# Patient Record
Sex: FEMALE | Race: WHITE
[De-identification: names, ages, dates, MRNs, and addresses within clinical notes are randomized per-mention and may not be internally consistent; named-entity substitution may affect disease eponyms.]

---

## 2018-03-14 ENCOUNTER — HOSPITAL ENCOUNTER (OUTPATIENT)
Dept: HOSPITAL 83 - MAMMO | Age: 63
Discharge: HOME | End: 2018-03-14
Attending: NURSE PRACTITIONER
Payer: MEDICARE

## 2018-03-14 DIAGNOSIS — Z12.31: Primary | ICD-10-CM

## 2019-06-14 ENCOUNTER — HOSPITAL ENCOUNTER (OUTPATIENT)
Dept: HOSPITAL 83 - US | Age: 64
Discharge: HOME | End: 2019-06-14
Attending: NURSE PRACTITIONER
Payer: MEDICARE

## 2019-06-14 DIAGNOSIS — I12.0: ICD-10-CM

## 2019-06-14 DIAGNOSIS — R06.83: ICD-10-CM

## 2019-06-14 DIAGNOSIS — I73.9: ICD-10-CM

## 2019-06-14 DIAGNOSIS — R40.0: ICD-10-CM

## 2019-06-14 DIAGNOSIS — N18.5: ICD-10-CM

## 2019-06-14 DIAGNOSIS — E78.5: Primary | ICD-10-CM

## 2019-06-14 DIAGNOSIS — D63.1: ICD-10-CM

## 2019-08-14 ENCOUNTER — HOSPITAL ENCOUNTER (EMERGENCY)
Dept: HOSPITAL 83 - ED | Age: 64
Discharge: HOME | End: 2019-08-14
Payer: MEDICARE

## 2019-08-14 VITALS — WEIGHT: 238 LBS | BODY MASS INDEX: 42.17 KG/M2 | HEIGHT: 62.99 IN

## 2019-08-14 DIAGNOSIS — Z95.1: ICD-10-CM

## 2019-08-14 DIAGNOSIS — Z79.899: ICD-10-CM

## 2019-08-14 DIAGNOSIS — Z95.5: ICD-10-CM

## 2019-08-14 DIAGNOSIS — Z98.51: ICD-10-CM

## 2019-08-14 DIAGNOSIS — E66.9: ICD-10-CM

## 2019-08-14 DIAGNOSIS — I13.2: ICD-10-CM

## 2019-08-14 DIAGNOSIS — E78.00: ICD-10-CM

## 2019-08-14 DIAGNOSIS — Z79.82: ICD-10-CM

## 2019-08-14 DIAGNOSIS — Z91.040: ICD-10-CM

## 2019-08-14 DIAGNOSIS — I48.91: ICD-10-CM

## 2019-08-14 DIAGNOSIS — I25.10: ICD-10-CM

## 2019-08-14 DIAGNOSIS — Z87.891: ICD-10-CM

## 2019-08-14 DIAGNOSIS — Z98.890: ICD-10-CM

## 2019-08-14 DIAGNOSIS — N18.6: ICD-10-CM

## 2019-08-14 DIAGNOSIS — I25.2: ICD-10-CM

## 2019-08-14 DIAGNOSIS — I50.30: ICD-10-CM

## 2019-08-14 DIAGNOSIS — K21.9: ICD-10-CM

## 2019-08-14 DIAGNOSIS — J44.9: Primary | ICD-10-CM

## 2019-08-14 DIAGNOSIS — Z99.2: ICD-10-CM

## 2019-08-14 LAB
ALBUMIN SERPL-MCNC: 3.7 GM/DL (ref 3.1–4.5)
ALP SERPL-CCNC: 77 U/L (ref 45–117)
ALT SERPL W P-5'-P-CCNC: 40 U/L (ref 12–78)
AST SERPL-CCNC: 27 IU/L (ref 3–35)
BASOPHILS # BLD AUTO: 1 % (ref 0–1)
BUN SERPL-MCNC: 33 MG/DL (ref 7–24)
CHLORIDE SERPL-SCNC: 99 MMOL/L (ref 98–107)
CREAT SERPL-MCNC: 8.57 MG/DL (ref 0.55–1.02)
ERYTHROCYTE [DISTWIDTH] IN BLOOD BY AUTOMATED COUNT: 14.6 % (ref 0–14.5)
HCT VFR BLD AUTO: 36.7 % (ref 37–47)
HGB BLD-MCNC: 11.4 G/DL (ref 12–16)
MACROCYTES BLD QL SMEAR: SLIGHT
MCH RBC QN AUTO: 34.7 PG (ref 27–31)
MCHC RBC AUTO-ENTMCNC: 31.1 G/DL (ref 33–37)
MCV RBC AUTO: 111.6 FL (ref 81–99)
NRBC BLD QL AUTO: 0 % (ref 0–0)
PHOSPHATE SERPL-MCNC: 5.4 MG/DL (ref 2.5–4.9)
PLATELET # BLD AUTO: 202 10*3/UL (ref 130–400)
PLATELET SUFFICIENCY: NORMAL
PMV BLD AUTO: 9.7 FL (ref 9.6–12.3)
POTASSIUM SERPL-SCNC: 4.4 MMOL/L (ref 3.5–5.1)
PROT SERPL-MCNC: 7.7 GM/DL (ref 6.4–8.2)
RBC # BLD AUTO: 3.29 10*6/UL (ref 4.1–5.1)
SODIUM SERPL-SCNC: 139 MMOL/L (ref 136–145)
TOTAL CELLS COUNTED: 100 #CELLS
WBC NRBC COR # BLD AUTO: 4.9 10*3/UL (ref 4.8–10.8)

## 2020-06-16 ENCOUNTER — HOSPITAL ENCOUNTER (INPATIENT)
Dept: HOSPITAL 83 - ED | Age: 65
LOS: 6 days | Discharge: ANOTHER HOSPITAL | DRG: 628 | End: 2020-06-22
Attending: INTERNAL MEDICINE | Admitting: INTERNAL MEDICINE
Payer: MEDICARE

## 2020-06-16 VITALS — DIASTOLIC BLOOD PRESSURE: 44 MMHG | SYSTOLIC BLOOD PRESSURE: 123 MMHG

## 2020-06-16 VITALS — DIASTOLIC BLOOD PRESSURE: 39 MMHG | SYSTOLIC BLOOD PRESSURE: 127 MMHG

## 2020-06-16 VITALS — DIASTOLIC BLOOD PRESSURE: 39 MMHG

## 2020-06-16 VITALS — WEIGHT: 233.38 LBS | HEIGHT: 62.99 IN | BODY MASS INDEX: 41.35 KG/M2

## 2020-06-16 VITALS — DIASTOLIC BLOOD PRESSURE: 51 MMHG

## 2020-06-16 DIAGNOSIS — D63.8: ICD-10-CM

## 2020-06-16 DIAGNOSIS — N18.6: ICD-10-CM

## 2020-06-16 DIAGNOSIS — Z79.899: ICD-10-CM

## 2020-06-16 DIAGNOSIS — Z79.82: ICD-10-CM

## 2020-06-16 DIAGNOSIS — E11.621: ICD-10-CM

## 2020-06-16 DIAGNOSIS — E43: ICD-10-CM

## 2020-06-16 DIAGNOSIS — E87.5: ICD-10-CM

## 2020-06-16 DIAGNOSIS — J44.1: ICD-10-CM

## 2020-06-16 DIAGNOSIS — Z95.1: ICD-10-CM

## 2020-06-16 DIAGNOSIS — E78.2: ICD-10-CM

## 2020-06-16 DIAGNOSIS — Z82.3: ICD-10-CM

## 2020-06-16 DIAGNOSIS — Z99.2: ICD-10-CM

## 2020-06-16 DIAGNOSIS — E66.9: ICD-10-CM

## 2020-06-16 DIAGNOSIS — E66.01: ICD-10-CM

## 2020-06-16 DIAGNOSIS — Z95.5: ICD-10-CM

## 2020-06-16 DIAGNOSIS — E87.1: ICD-10-CM

## 2020-06-16 DIAGNOSIS — Z98.51: ICD-10-CM

## 2020-06-16 DIAGNOSIS — Z66: ICD-10-CM

## 2020-06-16 DIAGNOSIS — Z91.040: ICD-10-CM

## 2020-06-16 DIAGNOSIS — L97.529: ICD-10-CM

## 2020-06-16 DIAGNOSIS — Z83.3: ICD-10-CM

## 2020-06-16 DIAGNOSIS — E11.65: ICD-10-CM

## 2020-06-16 DIAGNOSIS — M86.172: ICD-10-CM

## 2020-06-16 DIAGNOSIS — K21.9: ICD-10-CM

## 2020-06-16 DIAGNOSIS — I50.32: ICD-10-CM

## 2020-06-16 DIAGNOSIS — Z95.2: ICD-10-CM

## 2020-06-16 DIAGNOSIS — Z80.8: ICD-10-CM

## 2020-06-16 DIAGNOSIS — I25.2: ICD-10-CM

## 2020-06-16 DIAGNOSIS — B95.2: ICD-10-CM

## 2020-06-16 DIAGNOSIS — E83.39: ICD-10-CM

## 2020-06-16 DIAGNOSIS — Z51.5: ICD-10-CM

## 2020-06-16 DIAGNOSIS — I25.10: ICD-10-CM

## 2020-06-16 DIAGNOSIS — Z82.49: ICD-10-CM

## 2020-06-16 DIAGNOSIS — E11.69: Primary | ICD-10-CM

## 2020-06-16 DIAGNOSIS — E11.42: ICD-10-CM

## 2020-06-16 DIAGNOSIS — Z84.1: ICD-10-CM

## 2020-06-16 DIAGNOSIS — Z20.828: ICD-10-CM

## 2020-06-16 DIAGNOSIS — E11.51: ICD-10-CM

## 2020-06-16 DIAGNOSIS — G25.81: ICD-10-CM

## 2020-06-16 DIAGNOSIS — B96.20: ICD-10-CM

## 2020-06-16 DIAGNOSIS — I48.91: ICD-10-CM

## 2020-06-16 DIAGNOSIS — Z91.09: ICD-10-CM

## 2020-06-16 DIAGNOSIS — L03.032: ICD-10-CM

## 2020-06-16 DIAGNOSIS — Z87.891: ICD-10-CM

## 2020-06-16 DIAGNOSIS — Z91.81: ICD-10-CM

## 2020-06-16 DIAGNOSIS — I11.0: ICD-10-CM

## 2020-06-16 LAB
ALBUMIN SERPL-MCNC: 2.8 GM/DL (ref 3.1–4.5)
ALP SERPL-CCNC: 58 U/L (ref 45–117)
ALT SERPL W P-5'-P-CCNC: 17 U/L (ref 12–78)
APTT PPP: 27.5 SECONDS (ref 20–32.1)
AST SERPL-CCNC: 16 IU/L (ref 3–35)
BASOPHILS # BLD AUTO: 1 % (ref 0–1)
BUN SERPL-MCNC: 36 MG/DL (ref 7–24)
CHLORIDE SERPL-SCNC: 98 MMOL/L (ref 98–107)
CREAT SERPL-MCNC: 8.05 MG/DL (ref 0.55–1.02)
ERYTHROCYTE [DISTWIDTH] IN BLOOD BY AUTOMATED COUNT: 13.1 % (ref 0–14.5)
HCT VFR BLD AUTO: 33.6 % (ref 37–47)
INR BLD: 1.1 (ref 2–3.5)
LIPASE SERPL-CCNC: 118 U/L (ref 73–393)
MACROCYTES BLD QL SMEAR: SLIGHT
MCH RBC QN AUTO: 34.2 PG (ref 27–31)
MCHC RBC AUTO-ENTMCNC: 31.5 G/DL (ref 33–37)
MCV RBC AUTO: 108.4 FL (ref 81–99)
NRBC BLD QL AUTO: 0 10*3/UL (ref 0–0)
PLATELET # BLD AUTO: 304 10*3/UL (ref 130–400)
PLATELET SUFFICIENCY: NORMAL
PMV BLD AUTO: 10 FL (ref 9.6–12.3)
POLYCHROMASIA BLD QL SMEAR: SLIGHT
POTASSIUM SERPL-SCNC: 4.6 MMOL/L (ref 3.5–5.1)
PROT SERPL-MCNC: 7.9 GM/DL (ref 6.4–8.2)
RBC # BLD AUTO: 3.1 10*6/UL (ref 4.1–5.1)
ROULEAUX BLD QL SMEAR: SLIGHT
SODIUM SERPL-SCNC: 135 MMOL/L (ref 136–145)
TOTAL CELLS COUNTED: 100 #CELLS
TROPONIN I SERPL-MCNC: < 0.015 NG/ML (ref ?–0.04)
WBC NRBC COR # BLD AUTO: 7.6 10*3/UL (ref 4.8–10.8)

## 2020-06-16 NOTE — NUR
NORCO GIVEN FOR PAIN RATED 7/10 IN LEFT FOOT AND ZOFRAN GIVEN PER PATIENT
REQUEST FOR COMPLAINTS OF NAUSEA WHEN TAKING NORCO. WILL ASSESS EFFECTIVENESS.

## 2020-06-16 NOTE — NUR
PT SITTING UP IN BED. AWAKE, ALERT AND ORIENTED. NO STATED COMPLAINTS AT THIS
TIME. DENIES PAIN. NO SOB NOTED ON ROOM AIR. RESPIRATIONS ARE EASY AND
REGULAR.
PT IS ABLE TO REPOSITION SELF AND IS ENCOURAGED TO DO SO.
 
BED IN LOWEST LOCKED POSITION AND CALL LIGHT WITHIN REACH. WILL CONTINUE TO
MONITOR.

## 2020-06-16 NOTE — NUR
Time: 1410
A 64  year old FEMALE admitted to 5E
under services of SAMI SPIVEY DO.
Pt. arrived via ambulatory from
ER.  Chief complaint: CELLULITIS OF L GREAT TOE.
 
PAOLA JENKINS

## 2020-06-16 NOTE — NUR
TYLENOL GIVEN PER PATIENT REQUEST FOR COMPLAINTS OF LEFT FOOT PAIN RATED 7/10.
WILL ASSESS EFFECTIVENESS.

## 2020-06-17 VITALS — DIASTOLIC BLOOD PRESSURE: 75 MMHG | SYSTOLIC BLOOD PRESSURE: 136 MMHG

## 2020-06-17 VITALS — DIASTOLIC BLOOD PRESSURE: 45 MMHG

## 2020-06-17 VITALS — DIASTOLIC BLOOD PRESSURE: 40 MMHG

## 2020-06-17 VITALS — DIASTOLIC BLOOD PRESSURE: 35 MMHG | SYSTOLIC BLOOD PRESSURE: 97 MMHG

## 2020-06-17 VITALS — DIASTOLIC BLOOD PRESSURE: 52 MMHG

## 2020-06-17 VITALS — DIASTOLIC BLOOD PRESSURE: 34 MMHG | SYSTOLIC BLOOD PRESSURE: 99 MMHG

## 2020-06-17 VITALS — DIASTOLIC BLOOD PRESSURE: 46 MMHG

## 2020-06-17 VITALS — DIASTOLIC BLOOD PRESSURE: 38 MMHG

## 2020-06-17 VITALS — DIASTOLIC BLOOD PRESSURE: 32 MMHG

## 2020-06-17 LAB
25(OH)D3 SERPL-MCNC: 22.3 NG/ML (ref 30–100)
ALBUMIN SERPL-MCNC: 2.6 GM/DL (ref 3.1–4.5)
ALP SERPL-CCNC: 54 U/L (ref 45–117)
ALT SERPL W P-5'-P-CCNC: 18 U/L (ref 12–78)
AST SERPL-CCNC: 14 IU/L (ref 3–35)
BASO STIPL BLD QL SMEAR: SLIGHT
BUN SERPL-MCNC: 47 MG/DL (ref 7–24)
CHLORIDE SERPL-SCNC: 99 MMOL/L (ref 98–107)
CHOLEST SERPL-MCNC: 141 MG/DL (ref ?–200)
CREAT SERPL-MCNC: 9.05 MG/DL (ref 0.55–1.02)
ERYTHROCYTE [DISTWIDTH] IN BLOOD BY AUTOMATED COUNT: 12.9 % (ref 0–14.5)
HCT VFR BLD AUTO: 31.4 % (ref 37–47)
HDLC SERPL-MCNC: 38 MG/DL (ref 40–60)
LDLC SERPL DIRECT ASSAY-MCNC: 85 MG/DL (ref 9–159)
MACROCYTES BLD QL SMEAR: SLIGHT
MCH RBC QN AUTO: 33.8 PG (ref 27–31)
MCHC RBC AUTO-ENTMCNC: 31.2 G/DL (ref 33–37)
MCV RBC AUTO: 108.3 FL (ref 81–99)
NRBC BLD QL AUTO: 0 % (ref 0–0)
PLATELET # BLD AUTO: 305 10*3/UL (ref 130–400)
PLATELET SUFFICIENCY: NORMAL
PMV BLD AUTO: 9.6 FL (ref 9.6–12.3)
POLYCHROMASIA BLD QL SMEAR: SLIGHT
POTASSIUM SERPL-SCNC: 5.2 MMOL/L (ref 3.5–5.1)
PROT SERPL-MCNC: 7.4 GM/DL (ref 6.4–8.2)
RBC # BLD AUTO: 2.9 10*6/UL (ref 4.1–5.1)
SODIUM SERPL-SCNC: 139 MMOL/L (ref 136–145)
T4 FREE SERPL-MCNC: 1.09 NG/DL (ref 0.76–1.46)
TOTAL CELLS COUNTED: 100 #CELLS
TRIGL SERPL-MCNC: 90 MG/DL (ref ?–150)
TSH SERPL DL<=0.005 MIU/L-ACNC: 2.2 UIU/ML (ref 0.36–4.75)
VITAMIN B12: 499 PG/ML (ref 247–911)
VLDLC SERPL CALC-MCNC: 18 MG/DL (ref 6–40)
WBC NRBC COR # BLD AUTO: 7.3 10*3/UL (ref 4.8–10.8)

## 2020-06-17 PROCEDURE — 0QBR0ZZ EXCISION OF LEFT TOE PHALANX, OPEN APPROACH: ICD-10-PCS | Performed by: PODIATRIST

## 2020-06-17 PROCEDURE — 5A1D70Z PERFORMANCE OF URINARY FILTRATION, INTERMITTENT, LESS THAN 6 HOURS PER DAY: ICD-10-PCS | Performed by: INTERNAL MEDICINE

## 2020-06-17 NOTE — NUR
Spoke with Dr. Bryant regarding wound care recommendations and she stated she
will put them in today.

## 2020-06-17 NOTE — NUR
PATIENT PUT CALL LIGHT ON AND STATED HER PAIN WAS RATED 8/10 AND THAT HER LEFT
FOOT WAS HURTING HER "PRETTY BAD". PATIENT REQUESTING NORCO. NORCO GIVEN. WILL
ASSESS EFFECTIVENESS.

## 2020-06-17 NOTE — NUR
DOTTY DOYLE [id number]
 
Please refer to the physician's history and physical for past medical history,
comorbid conditions, and allergies.
   Diagnosis: FAILURE OF OUTPATIENT TREATMENT,CELLULITIS OF GREA
 
   New Score: 20,LOW OR NO RISK
 
WOUND DESCRIPTIONS:
Dressing intact to left foot. No strikethrough drainage noted at time of
assessment. Patient stated this started 2 months ago with a little
blister/callus. She states that the dialysis center has been checking the
area. She states 2 weeks ago she went out shopping and came home and there was
blood on her socks and the area keeps getting worse. She states she will
follow with podiatry while she is here and when she is able to follow in the
wound care center she will. Wound Care center card given to patient.
   Surface the patient is resting on: Isoflex
 
SKIN PREVENTION RECOMMENDATION:
   1.  Pressure redistribution support surface as appropriate
   2.  Elevate heels
   3.  Remove boots/TEDS every shift and reapply
   4.  Head of bed 30 degrees as tolerated
   5.  Assess nutrition and hydration
   6.  Manage moisture
   7.  Avoid the use of containment devices while in bed
   8.  Use absorptive products on surfaces limit layers of linens on bed
   9.  Turn and reposition every 1-2 hours in bed and every 1 hour in chair as
       tolerated
   10. Weight shifts every 15 minutes while up in chair
   11. Offloading with pillows or device to keep heels elevated off bed
   12. Monitor skin at least every shift
   13. Inspect under medical devices twice a day
 
WOUND TREATMENT RECOMMENDATIONS:
Podiatry is already on consult and will mamanage dressing changes.
Id is aleady on consult.

## 2020-06-17 NOTE — NUR
SPOKE WITH DR DICK REGARDING PATIENT'S BLOOD PRESSURE /34. PATIENT IS
REQUESTING A NORCO DUE TO LEFT FOOT PAIN RATED 8/10. PATIENT HAD HD TODAY. PER
DR DICK, RECHECK BLOOD PRESSURE BEFORE GIVING NORCO. IF BLOOD PRESSURE IS THE
SAME OR HIGHER THAN 100/34 GO AHEAD AND GIVE THE NORCO AND THE RECHECK BLOOD
PRESSURE AGAIN NO LATER THAN AN HOUR AFTER ADMINISTERING. IF BLOOD PRESSURE IS
LOWER THAN LAST BLOOD PRESSURE /34 THEN DO NOT GIVE NORCO.

## 2020-06-17 NOTE — NUR
PATIENT HELPED TO RECLINER CHAIR AND PILLOW PLACED UNDER LEFT LEG. SHE STATED
THIS HAS HELPED HER "A LOT" AND SHE NO LONGER WANTS THE [place]. CALL LIGHT
WITHIN REACH. WILL CONTINUE TO MONITOR.

## 2020-06-17 NOTE — NUR
in to talk to patient.
Patient states lives at home alone with her family checking in on her.
There are 0 steps in the home.
Physician: Jessica Francisco
Pharmacy: [facility]
Home health services: [facility] on discharge
Patient's level of ADLs: INDEPENDENT
Patient has working utilities: yes
DME: walker, cane x 2
Follow-up physician's appointment after d/c: will be made by the hospitalist
nurse director upon discharge
Does patient want to access PORTAL?: no
Discharge plan discussed with patient. She lives at home alone with her family
checking in on her. She is normally independent in her ADLs and ambulation. She
does have a walker and 2 canes she can use. Discussed short term SNF and she
refuses. Discussed home health care services and she is agreeable. When
provided with a list of agencies she chose [facility] as she has had them in the
past. Discussed home IV antibiotics if needed and she is agreeable and willing
to learn how to administer them. When medically stable she will be discharged
to home with [facility] services. She states either her daughter or granddaughter
will provide transportation on discharge.
 
ASHLEY HUGHES

## 2020-06-17 NOTE — NUR
PHYSICAL THERAPY
 
Eval order received and chart reviewed, pt s/p surgery of LLE/foot this AM and
is currently in dialysis treatment. Will follow in the AM full assessment.
 
 
Lizzy Tse PT

## 2020-06-17 NOTE — NUR
PT RETURNED FROM DIALYSIS AT 1330. DIALYSIS RN REPORTED 2 LITERS OUT. VITALS
WNL. PATIENT RESTING . MO COMPLAINTS

## 2020-06-17 NOTE — NUR
CRITICAL LAB CALLED, PHOSPHORUS 9.4. NOTIFIED DR HAMILTON. PATIENT DOWN IN
SURGERY FOR I&D. NOTIFIED STEVEN FROM SURGERY OF RESULT AS WELL. NO NEW ORDERS
FROM DR HAMILTON.

## 2020-06-17 NOTE — NUR
Occupational therapy evaluation received and chart reviewed. Patient had
surgery this AM and is currently in dialysis. Will follow up with patient in
the AM for completion of an OT evaluation. Thank you.
 
India Beasley, OTR/L

## 2020-06-18 VITALS — DIASTOLIC BLOOD PRESSURE: 33 MMHG | SYSTOLIC BLOOD PRESSURE: 125 MMHG

## 2020-06-18 VITALS — SYSTOLIC BLOOD PRESSURE: 110 MMHG | DIASTOLIC BLOOD PRESSURE: 48 MMHG

## 2020-06-18 VITALS — DIASTOLIC BLOOD PRESSURE: 43 MMHG

## 2020-06-18 VITALS — SYSTOLIC BLOOD PRESSURE: 100 MMHG | DIASTOLIC BLOOD PRESSURE: 44 MMHG

## 2020-06-18 VITALS — DIASTOLIC BLOOD PRESSURE: 40 MMHG | SYSTOLIC BLOOD PRESSURE: 108 MMHG

## 2020-06-18 VITALS — DIASTOLIC BLOOD PRESSURE: 42 MMHG

## 2020-06-18 LAB — SPECIMEN PREPARATION: (no result)

## 2020-06-18 NOTE — NUR
PATIENT COMPLAINING OF INCREASED PAIN IN LEFT GREAT TOE SINCE PODIATRY PLACED
WOUND VAC BACK ON, PODIATRY RESIDENT AWARE. WILL DISCUSS WITH ATTENDING TODAY
AND SEE PATIENT LATER TODAY. PER PODIATRY, THEY SPOKE WITH DR. MOREL AND
THEY ARE WANTING TO TRANSFER PATIENT TO [place] TOMORROW FOR ANGIOGRAM

## 2020-06-18 NOTE — NUR
PT STATES NO BOWEL MOVEMENT FOR 3 DAYS, PRN GIVEN. WILL MONITOR EFFECT. WOUND
VAC ALARMING LOW PRESSURE, LINE CHECKED, NO KINKS IN LINE. WILL NOTIFY
PODIATRY, IT WAS PER PODIATRY REQUEST FOR DRESSING TO REMAIN INTACT UNTIL SEEN
BY THEM

## 2020-06-18 NOTE — NUR
in to see patient. She is sitting up in her bedside chair without
distress noted. Discussed short term rehab and she is agreeable. When provided
with a list of facilities she chose [facility]. She states she worked at
[facility] and doesn't want to go there.  notified.

## 2020-06-18 NOTE — NUR
OT NOTE
Patient was seen this date for occupational therapy treatment to maximize
safety and independence with transfers. Patient was agreeable to OT treatment,
reporting decreased LLE in comparison to being seen this AM. LLE wound vac
remained in place/intact throughout treatment. Patient educated on stand-pivot
transfers to maximize safety due to patient being unable at this time to
perform mobility with the WW and LLE NWB.  Patient verbalized a good
understanding of proper technique for SPT/lateral transfer toward "good
leg", LLE NWB, controlled movements, and calling for assistance. Attempted
SPT transfer from standard recliner to drop-arm BSC; however, patient was
unable to reach across to BSC due to the chair arm-rest being in the way.
Patient completed a functional sit/stand Min Ax2 from standard recliner and
returned to seated in a recliner with a removable arm-rest. The recliner R
arm-rest was removed, patient performed a SPT/lateral scoot transfer towards
her RLE onto the EOB with Min A from the upper body, a second person maintaing
LLE NWB with CGA, and grasping the bedrail for support. Patient then performed
a two additional SPT/lateral scoot from EOB <> BSC with Min Ax1 for upper body
and CGA for the LLE with the bedrail and drop-arm rail removed. Patient
verbalized a good understanding of the functionality of SPT/lateral scoot
transfers and safety. Patient supine in bed, alarm on, all needs within reach
at conclusion.  Patient to continue with POC as able, rec D/c to SNF.
 
India Beasley, OTR/L

## 2020-06-18 NOTE — NUR
Occupational Therapy evaluation completed on five with full evaluation to
follow.  Recommend occupational therapy per plan of care and SNF upon
discharge.  Thank you for this referral.
 
India Beasley OTR/L

## 2020-06-18 NOTE — NUR
SPOKE TO PODIATRY RESIDENT, TOOK WOUND VAC DRESSING DOWN, BLACK SPONGE IS
STUCK TO WOUND BED, ATTEMPTED TO SOAK WITH NS AND DRESSING WILL NOT COME OFF.
PODIATRY RESIDENT TO COME UP AND CHANGE DRESSING/WOUND VAC. NOTIFIED
SUPERVISOR CHARLEE NEED WOUND VAC SUPPLIES.

## 2020-06-18 NOTE — NUR
NORCO GIVEN PER PATIENT REQUEST FOR COMPLAINTS OF PAIN RATED 7/10 IN LEFT
FOOT. WILL ASSESS EFFECTIVENESS.

## 2020-06-18 NOTE — NUR
PT STATES PAIN LEFT GREAT TOE 10/10, IV DILAUDID GIVEN. PT UNDERSTANDS
MEDICATION. WILL MONITOR FOR EFFECTIVENESS

## 2020-06-18 NOTE — NUR
ASSUMED CARE FOR THIS PT AT THIS TIME.  PT RESTING QUIETLY IN BED W/EYES
CLOSED.  NO S/S OF DISTRESS NOTED.  CALL LIGHT IN REACH.

## 2020-06-18 NOTE — NUR
PHYSICAL THERAPY
 
Pt sitting up in chair wound vac now functioning after being redressed pt
states pain LLE/foot 6/10 has been issued pain meds.
STS min assist x 1 from recliner chair Stood with FWW x 1 for 1 minute with
cg-min assist x 1 maintaining NWB LLE with occasional VQ's
Performed partial SPT/lateral scoot chair-->bed (armrest removed from
chair) then bed<-->bsc (with drop arm commode and HR on bed down) all with min
assist x 1 for upper body and cga x 1 for LLE. Maintained NWB of LLE t/o
activity with cga and cues.
Tolerated well good progress remained in bed after session wound vac intact
call bell in reach bed alarm engaged LLE elevated on pillow. Nsg updated
Will follow per POC cont to recomend SNF at discharge
 
 
Lizzy Tse PT

## 2020-06-18 NOTE — NUR
Physical Therapy evaluation completed on 5th floor with full evaluation to
follow.  Recommend physical therapy per plan of care and SNF upon discharge.
Did discuss rehab with patient given LLE NWB with wound vac, pt lives alone
and drives herself to dialysis. Limited mobility at this time due to above
please see eval.  Thank you for this referral.
 
 
 
Lizzy Tse PT

## 2020-06-18 NOTE — NUR
SPOKE TO A NURSE AT [facility] AND SHE STATED THAT PATIENT WOULD HAVE TO HAVE
DISCHARGE PLANS SET UP PRIOR TO TRANSFER TO [facility] FOR PROCEDURE [place].
SPOKE TO JASON COLEMAN IN CARE MANAGEMENT AND SHE IS STATING THAT THE PATIENT
WOULD NEED A PRECERT PRIOR TO GOING TO [facility] AND WE WILL NOT HAVE THAT
PRECERT BY TOMORROW

## 2020-06-18 NOTE — NUR
PER PODIATRY, TURN WOUND VAC OFF THEN BACK ON AND SEE IF BEGINS TO FUNCTION.
IF DOES NOT FUNCTION, REMOVE WOUND VAC AND PLACE WET TO DRY DRESSING

## 2020-06-18 NOTE — NUR
Discussed [facility] not having any beds at this time. When provided with
a list of other facilities she chose [facility]. 
notified.

## 2020-06-18 NOTE — NUR
PATIENT SITTING UP IN THE CHAIR ON PHONE. NO SIGNS OR SYMPTOMS OF DISTRESS OR
DISCOMFORT NOTED. PATIEN TALKATIVE. ZOSYN RUNNING. CALL LIGHT WITHIN REACH.
WILL CONTINUE TO MONITOR.

## 2020-06-19 VITALS — DIASTOLIC BLOOD PRESSURE: 46 MMHG

## 2020-06-19 VITALS — SYSTOLIC BLOOD PRESSURE: 117 MMHG | DIASTOLIC BLOOD PRESSURE: 86 MMHG

## 2020-06-19 VITALS — DIASTOLIC BLOOD PRESSURE: 35 MMHG | SYSTOLIC BLOOD PRESSURE: 97 MMHG

## 2020-06-19 VITALS — DIASTOLIC BLOOD PRESSURE: 38 MMHG

## 2020-06-19 LAB
BUN SERPL-MCNC: 47 MG/DL (ref 7–24)
CHLORIDE SERPL-SCNC: 99 MMOL/L (ref 98–107)
CREAT SERPL-MCNC: 8.7 MG/DL (ref 0.55–1.02)
ERYTHROCYTE [DISTWIDTH] IN BLOOD BY AUTOMATED COUNT: 13.2 % (ref 0–14.5)
HCT VFR BLD AUTO: 33.1 % (ref 37–47)
MACROCYTES BLD QL SMEAR: (no result)
MCH RBC QN AUTO: 33.8 PG (ref 27–31)
MCHC RBC AUTO-ENTMCNC: 30.5 G/DL (ref 33–37)
MCV RBC AUTO: 110.7 FL (ref 81–99)
NRBC BLD QL AUTO: 0 10*3/UL (ref 0–0)
PLATELET # BLD AUTO: 326 10*3/UL (ref 130–400)
PLATELET SUFFICIENCY: NORMAL
PMV BLD AUTO: 9.8 FL (ref 9.6–12.3)
POLYCHROMASIA BLD QL SMEAR: SLIGHT
POTASSIUM SERPL-SCNC: 5.4 MMOL/L (ref 3.5–5.1)
RBC # BLD AUTO: 2.99 10*6/UL (ref 4.1–5.1)
ROULEAUX BLD QL SMEAR: SLIGHT
SODIUM SERPL-SCNC: 135 MMOL/L (ref 136–145)
TOTAL CELLS COUNTED: 100 #CELLS
WBC NRBC COR # BLD AUTO: 8.2 10*3/UL (ref 4.8–10.8)

## 2020-06-19 PROCEDURE — 5A1D70Z PERFORMANCE OF URINARY FILTRATION, INTERMITTENT, LESS THAN 6 HOURS PER DAY: ICD-10-PCS | Performed by: INTERNAL MEDICINE

## 2020-06-19 NOTE — NUR
[facility] CALLED AND STATED PT IS TO BE THERE ON MONDAY BY 10:30 FOR PROCEDURE
BUT NOT TO SEND HER UNTIL WE HEAR FROM THEM REGARDING THE PLANS FOR AFTER. IF
THEY HAVE NOT CALLED BY 9:15 AM MONDAY PLEASE CALL THEM TO CONFIRM TRANSFER.

## 2020-06-19 NOTE — NUR
ASSUMED CARE FOR THIS PT AT THIS TIME.  PT C/O LT FOOT PAIN 7/10.  MEDICATED
W/DILAUDID IVP.  DRSG TO LT FOOT DRY/INTACT/ELEVATED ON PILLOWS.  CALL LIGHT
IN REACH.

## 2020-06-19 NOTE — NUR
OT NOTE
Attempted to see pt this A.M. for OT session and upon arrival pt was out of
the room for dialysis. Will check back at a later time/date and continue with
POC as able.
 
TAE Vaca/LINDA

## 2020-06-19 NOTE — NUR
LSW SPOKE WITH CHANTELLE. PATIENT WOULD NEED TO BE ADMITTED TO [facility] AFTER
PROCEDURE AND HAVE THEM START PRECERT FOR HER TO BE ADMITTED TO THEIR
FACILITY FROM THERE. LSW NOTIFIED KAY ABRAHAM.

## 2020-06-19 NOTE — NUR
PATIENT MEDICATED WITH IVP DILAUDID FOR PAIN RATED 10/10 IN HER LEFT FOOT SHE
STATES WHERE THE WOUND VAC IS PLACED. WILL CONTINUE TO MONITOR.

## 2020-06-19 NOTE — NUR
Spoke with Dr. Bryant regarding clarification order for wound vac she states
she will speak with the patient and update it as needed

## 2020-06-19 NOTE — NUR
VIMALW HAS MESSAGE TO [facility] ABOUT PRECERT AND ACCEPTING THE PATIENT AFTER
TRANSFER TO [place].

## 2020-06-19 NOTE — NUR
PHYSICAL THERAPY CO-SIGN
 
 
I approve of the Physical Therapy notes written above.
 
 
Lizzy Tse PT

## 2020-06-19 NOTE — NUR
PT C/O LLE PAIN 10/10.  PT WANTS WOUND VAC OFF. PT TEACHING GIVEN THAT THIS
NURSE IS NOT PERMITTED TO REMOVE WOUND VAC.  PT MEDICATE W/IVP DILAUDID 0.5MG.

## 2020-06-19 NOTE — NUR
PODIATRY RESIDENT NOTIFIED THAT PATIENT HAS RETURNED FROM DIALYSIS AND WOULD
LIKE THE WOUND VAC REMOVED.

## 2020-06-19 NOTE — NUR
PHYSICAL THERAPY
 
Patient seen this pm 1;1 for therapy visit and was sitting up on EOB upon
therapist arrival. Patient identified by name /  and presented with
continuos 02-2.5L via NC. OT assistant was also present for observation this
session as patient reports mild 1-2 c/o of L foot pain. Patient stated she was
happy that the wound vac was d/c earlier today and transfers sit to stand CGA.
Patient needed v/c for improved safe hand placement and completed SPT to BSC
with use of wh walker, CGA, demonstrating bouts of impulsive behaviour which
contributes to increased unsteady standing balance. Patient educated on
improved SPT technique then completed SPT to bedside chair CGA, demonstrating
smoother pivot sequence. Patient also able to "bunny hop" backwards several
steps and remained in bedside chair with call light, tray table and telephone.
Will continue per POC as tolerated, total treatment time 14 minutes.
Carmine Parmar, PTA

## 2020-06-19 NOTE — NUR
OCCUPATIONAL THERAPY CO-SIGN
 
I approve of the Occupational Therapy notes written above.
 
HAMILTON ROUSE, OTR/L

## 2020-06-19 NOTE — NUR
PENDING ACCEPTANCE TO [facility]. WILL NEED COVID TEST RESULTS BEFORE ADMISSION TO
[facility]. PRECERT WILL BE REQUIRED. LSW COMPLETED HENS.

## 2020-06-20 VITALS — DIASTOLIC BLOOD PRESSURE: 40 MMHG | SYSTOLIC BLOOD PRESSURE: 102 MMHG

## 2020-06-20 VITALS — DIASTOLIC BLOOD PRESSURE: 50 MMHG | SYSTOLIC BLOOD PRESSURE: 108 MMHG

## 2020-06-20 VITALS — DIASTOLIC BLOOD PRESSURE: 45 MMHG

## 2020-06-20 VITALS — DIASTOLIC BLOOD PRESSURE: 48 MMHG

## 2020-06-20 VITALS — DIASTOLIC BLOOD PRESSURE: 54 MMHG

## 2020-06-20 NOTE — NUR
IN PT ROOM TO COMPLETE ASSESSMENT. PT STATES SHE HAS SOME PAIN IN IN HER LEFT
FOOT, BUT NOT ANY DIFFERENT THAN NORMAL. SHE HAS NOT HAD A BOWEL MOVEMENT
SINCE 06/16/20 SO SHE WANTS SOMETHING TO HELP MOVE HER BOWELS, WILL BRING
SOMETHING IN. CALL LIGHT WITHIN REACH, WILL CONTINUE TO MONITOR

## 2020-06-20 NOTE — NUR
DR. DICK NOTIFIED OF PT'S CONTINUED C/O CONSTIPATION AND DULCOLAX INEFFECTIVE.
OK TO ADMINISTER MIRALAX.

## 2020-06-20 NOTE — NUR
PRN DULCOLAX GIVEN FOR COMPLAINTS OF CONSTIPATION, WILL MONITOR FOR
EFFECTIVNESS. CALL LIGHT WITHIN REACH

## 2020-06-21 VITALS — DIASTOLIC BLOOD PRESSURE: 47 MMHG

## 2020-06-21 VITALS — SYSTOLIC BLOOD PRESSURE: 126 MMHG | DIASTOLIC BLOOD PRESSURE: 47 MMHG

## 2020-06-21 VITALS — DIASTOLIC BLOOD PRESSURE: 42 MMHG | SYSTOLIC BLOOD PRESSURE: 98 MMHG

## 2020-06-21 VITALS — DIASTOLIC BLOOD PRESSURE: 35 MMHG

## 2020-06-21 VITALS — DIASTOLIC BLOOD PRESSURE: 45 MMHG

## 2020-06-21 LAB
ALBUMIN SERPL-MCNC: 2.4 GM/DL (ref 3.1–4.5)
ALP SERPL-CCNC: 43 U/L (ref 45–117)
ALT SERPL W P-5'-P-CCNC: 32 U/L (ref 12–78)
AST SERPL-CCNC: 99 IU/L (ref 3–35)
BASOPHILS # BLD AUTO: 0 10*3/UL (ref 0–0.1)
BASOPHILS NFR BLD AUTO: 0.4 % (ref 0–1)
BUN SERPL-MCNC: 48 MG/DL (ref 7–24)
CHLORIDE SERPL-SCNC: 99 MMOL/L (ref 98–107)
CREAT SERPL-MCNC: 8.66 MG/DL (ref 0.55–1.02)
EOSINOPHIL # BLD AUTO: 0.2 10*3/UL (ref 0–0.4)
EOSINOPHIL # BLD AUTO: 3 % (ref 1–4)
ERYTHROCYTE [DISTWIDTH] IN BLOOD BY AUTOMATED COUNT: 13.2 % (ref 0–14.5)
HCT VFR BLD AUTO: 32 % (ref 37–47)
LYMPHOCYTES # BLD AUTO: 1.1 10*3/UL (ref 1.3–4.4)
LYMPHOCYTES NFR BLD AUTO: 15.3 % (ref 27–41)
MCH RBC QN AUTO: 33.7 PG (ref 27–31)
MCHC RBC AUTO-ENTMCNC: 31.3 G/DL (ref 33–37)
MCV RBC AUTO: 107.7 FL (ref 81–99)
MONOCYTES # BLD AUTO: 0.5 10*3/UL (ref 0.1–1)
MONOCYTES NFR BLD MANUAL: 6.1 % (ref 3–9)
NEUT #: 5.5 10*3/UL (ref 2.3–7.9)
NEUT %: 74.5 % (ref 47–73)
NRBC BLD QL AUTO: 0 10*3/UL (ref 0–0)
PLATELET # BLD AUTO: 267 10*3/UL (ref 130–400)
PMV BLD AUTO: 9.5 FL (ref 9.6–12.3)
POTASSIUM SERPL-SCNC: 4.7 MMOL/L (ref 3.5–5.1)
PROT SERPL-MCNC: 7.4 GM/DL (ref 6.4–8.2)
RBC # BLD AUTO: 2.97 10*6/UL (ref 4.1–5.1)
SODIUM SERPL-SCNC: 134 MMOL/L (ref 136–145)
WBC NRBC COR # BLD AUTO: 7.3 10*3/UL (ref 4.8–10.8)

## 2020-06-21 NOTE — NUR
ASSESSMENT COMPLETE. PT WAS SLEEPING AND WOKE UP EASILY AND VOICES THAT SHE
STILL IS FEELING CONSTIPATED. RESPIRATIONS ARE EASY AND REGULAR. CALL LIGHT
WITHIN REACH, WILL CONTINUE TO MONITOR

## 2020-06-21 NOTE — NUR
PATIENT RESTING QUIETLY IN BED. NO COMPLAINTS AT THIS TIME. CALL LIGHT WITHIN
REACH. SITTING IN CHAIR WATCHING TV. WILL CONTINUE TO MONITOR.

## 2020-06-22 VITALS — DIASTOLIC BLOOD PRESSURE: 49 MMHG | SYSTOLIC BLOOD PRESSURE: 112 MMHG

## 2020-06-22 VITALS — SYSTOLIC BLOOD PRESSURE: 91 MMHG | DIASTOLIC BLOOD PRESSURE: 46 MMHG

## 2020-06-22 VITALS — SYSTOLIC BLOOD PRESSURE: 114 MMHG | DIASTOLIC BLOOD PRESSURE: 41 MMHG

## 2020-06-22 PROCEDURE — 5A1D70Z PERFORMANCE OF URINARY FILTRATION, INTERMITTENT, LESS THAN 6 HOURS PER DAY: ICD-10-PCS | Performed by: INTERNAL MEDICINE

## 2020-06-22 NOTE — NUR
Spoke with Dr. Bryant regarding dressing change orders. She stated to d/c
wound vac order and she is currently getting wet to dry betadine dressing
daily.

## 2020-06-22 NOTE — NUR
PHYSICAL THERAPY
 
Patient seen this pm 1:1 for therapy visit and was resting supine in bed
following lunch and presents with continuos IV treatment. Patient identified
by name /  and was joined by OT assistant for observation only this
session. Patient stated she had received dialysis this am and was feeling a
little "sluggish" this afternoon. Patient is still NWB on L LE, tranfering
supine to sit EOB with SBA x 1, then sit to stand CGA. use of wh walker
standing support. Patient ambulated 8'x 1 to bedside chair, CGA, wh walker,
demonstrating "bunny hop" aleksandra with Fair+  standing balance. Patient stated
she felt more confident this session and was eager to ambulate a second trial,
CGA, wh walker, 15'x 1, demonstrating mild fatigue upon return to bedside
chair. Patient was 100% compliant with NWB status L LE and would benefit from
SNF to improve standing tolerance, improved dynamic balance and safe mobilty.
Patient remained semi reclined in chair with B LE's elevated, call light, tray
table and cell phone. Will continue per POC as tolerated, total treatment time
16 minutes.   Carmine Parmar, PTA

## 2020-06-22 NOTE — NUR
OT NOTE
Pt seen this date for 1:1 therapy session for 20 min and was identified by
name and . Upon arrival pt supine in bed and agreeable to treatment. Pt
stated pain was "not bad" and did not rate on a scale of 1-10. Pt completed
supine to sit transfer to EOB w SBA and one verbal cue for technique. Pt
maintained LLE NWB precautions through entire treatment. Pt completed sit to
stand w use of ww and CGA w good technique and ambulated to recliner.
Noted good technique and carryover for transfers and functional mobility from
previous session with increased confidence. Pt completed stand to sit transfer
into recliner w CGA, ww and good safety awareness. Pt took a 2 minute rest
break before completing sit to stand w ww and CGA followed by ambulating to
bathroom door. Upon arrival to the bathroom door quick onset of fatigue was
noted and pt was able to self recognize resulting in returning to the recliner
w CGA and ww.  At end of session pt seated in recliner w call light in reach.
Continue w recommended D/C plan to SNF.
 
NEERAJ Ravi/TAE Rodriguez/LINDA

## 2020-06-22 NOTE — NUR
DISCHARGE WOUND PHOTO OBTAINED IN PREPARATION FOR TRANSFER TO [facility] FOR REVASCULARIZATION OF BILATERAL LEGS.

## 2020-06-22 NOTE — NUR
NORCO GIVEN PER PATIENT REQUEST FOR COMPLAINTS OF LEFT FOOT PAIN RATED 8/10.
WILL ASSESS EFFECTIVENESS.

## 2020-06-22 NOTE — NUR
DIALYSIS NURSE CALLED. PER DR HAMPTON, GIVE PATIENT 1000 AM DOSE OF MIDODRINE AND
THEN TAKE TO DIALYSIS.

## 2020-06-22 NOTE — NUR
PATIENT WATCHING TV. STATED LEFT FOOT WAS A 1/10. NOT IN PAIN AT THIS TIME.
MORNING MEDS TAKEN AND BLOOD SUGAR CHECKED. RESULT WAS 97. CALL LIGHT WITHIN
REACH. WILL CONTINUE TO MONITOR.

## 2020-06-22 NOTE — NUR
PATIENT DISCHARGED TO [facility] BY [facility].  REPORT CALLED TO RECEIVING NURSE AT THE HOSPITAL.

## 2020-06-22 NOTE — NUR
PATIENT RETURNED FROM DIALYSIS, RESUMING MEDICATIONS AND DIET, SEE VITAL SIGNS
AND ASSESSMENT FLOW FOR DETAILS.

## 2020-06-23 NOTE — NUR
PHYSICAL THERAPY CO-SIGN
 
 
I approve of the Physical Therapy notes written above.
 
                                ASHLEY STONE PT,DPT

## 2020-06-23 NOTE — NUR
OCCUPATIONAL THERAPY CO-SIGN
 
I approve of the Occupational Therapy notes written above.
Zoe Morin OTR/L

## 2020-06-24 NOTE — NUR
Received call from Dr. White's office regarding where patient was discharged
to as patient was supposed to follow up with them. Explained patient was
discharged to [facility] for vascular surgery.

## 2020-07-11 ENCOUNTER — HOSPITAL ENCOUNTER (EMERGENCY)
Dept: HOSPITAL 83 - ED | Age: 65
Discharge: HOME | End: 2020-07-11
Payer: MEDICARE

## 2020-07-11 DIAGNOSIS — Z79.899: ICD-10-CM

## 2020-07-11 DIAGNOSIS — Z88.8: ICD-10-CM

## 2020-07-11 DIAGNOSIS — Z79.82: ICD-10-CM

## 2020-07-11 DIAGNOSIS — T82.898A: Primary | ICD-10-CM

## 2020-07-11 DIAGNOSIS — Z91.040: ICD-10-CM
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
## 2020-09-19 ENCOUNTER — HOSPITAL ENCOUNTER (EMERGENCY)
Dept: HOSPITAL 83 - ED | Age: 65
Discharge: HOME | End: 2020-09-19
Payer: MEDICARE

## 2020-09-19 VITALS — HEIGHT: 62.99 IN | BODY MASS INDEX: 38.98 KG/M2 | WEIGHT: 220 LBS

## 2020-09-19 DIAGNOSIS — Z79.2: ICD-10-CM

## 2020-09-19 DIAGNOSIS — I25.10: ICD-10-CM

## 2020-09-19 DIAGNOSIS — Z99.2: ICD-10-CM

## 2020-09-19 DIAGNOSIS — N18.6: ICD-10-CM

## 2020-09-19 DIAGNOSIS — I25.2: ICD-10-CM

## 2020-09-19 DIAGNOSIS — Z95.5: ICD-10-CM

## 2020-09-19 DIAGNOSIS — L03.116: Primary | ICD-10-CM

## 2020-09-19 DIAGNOSIS — I13.2: ICD-10-CM

## 2020-09-19 DIAGNOSIS — Z79.82: ICD-10-CM

## 2020-09-19 DIAGNOSIS — Z79.899: ICD-10-CM

## 2020-09-19 DIAGNOSIS — Z98.51: ICD-10-CM

## 2020-09-19 DIAGNOSIS — I50.9: ICD-10-CM

## 2020-09-19 DIAGNOSIS — Z91.048: ICD-10-CM

## 2020-09-19 DIAGNOSIS — Z87.891: ICD-10-CM

## 2020-09-19 DIAGNOSIS — Z89.412: ICD-10-CM

## 2020-09-19 DIAGNOSIS — Z91.040: ICD-10-CM

## 2020-09-19 DIAGNOSIS — K21.9: ICD-10-CM

## 2020-09-19 LAB
BASOPHILS # BLD AUTO: 0 10*3/UL (ref 0–0.1)
BASOPHILS NFR BLD AUTO: 0.3 % (ref 0–1)
BUN SERPL-MCNC: 37 MG/DL (ref 7–24)
CHLORIDE SERPL-SCNC: 99 MMOL/L (ref 98–107)
CREAT SERPL-MCNC: 9.06 MG/DL (ref 0.55–1.02)
EOSINOPHIL # BLD AUTO: 0.3 10*3/UL (ref 0–0.4)
EOSINOPHIL # BLD AUTO: 4.7 % (ref 1–4)
ERYTHROCYTE [DISTWIDTH] IN BLOOD BY AUTOMATED COUNT: 15.3 % (ref 0–14.5)
HCT VFR BLD AUTO: 38.5 % (ref 37–47)
LYMPHOCYTES # BLD AUTO: 0.8 10*3/UL (ref 1.3–4.4)
LYMPHOCYTES NFR BLD AUTO: 12.8 % (ref 27–41)
MCH RBC QN AUTO: 31.8 PG (ref 27–31)
MCHC RBC AUTO-ENTMCNC: 30.6 G/DL (ref 33–37)
MCV RBC AUTO: 103.8 FL (ref 81–99)
MONOCYTES # BLD AUTO: 0.4 10*3/UL (ref 0.1–1)
MONOCYTES NFR BLD MANUAL: 5.5 % (ref 3–9)
NEUT #: 4.9 10*3/UL (ref 2.3–7.9)
NEUT %: 76.5 % (ref 47–73)
NRBC BLD QL AUTO: 0 10*3/UL (ref 0–0)
PLATELET # BLD AUTO: 241 10*3/UL (ref 130–400)
PMV BLD AUTO: 10.2 FL (ref 9.6–12.3)
POTASSIUM SERPL-SCNC: 4.5 MMOL/L (ref 3.5–5.1)
RBC # BLD AUTO: 3.71 10*6/UL (ref 4.1–5.1)
SODIUM SERPL-SCNC: 139 MMOL/L (ref 136–145)
WBC NRBC COR # BLD AUTO: 6.3 10*3/UL (ref 4.8–10.8)

## 2020-12-10 ENCOUNTER — HOSPITAL ENCOUNTER (OUTPATIENT)
Dept: HOSPITAL 83 - COVID19 | Age: 65
Discharge: HOME | End: 2020-12-10
Attending: NURSE PRACTITIONER
Payer: MEDICARE

## 2020-12-10 DIAGNOSIS — U07.1: Primary | ICD-10-CM

## 2020-12-19 ENCOUNTER — HOSPITAL ENCOUNTER (INPATIENT)
Dept: HOSPITAL 83 - ED | Age: 65
LOS: 7 days | Discharge: HOME | DRG: 871 | End: 2020-12-26
Attending: INTERNAL MEDICINE | Admitting: INTERNAL MEDICINE
Payer: MEDICARE

## 2020-12-19 VITALS — BODY MASS INDEX: 36.58 KG/M2 | HEIGHT: 63.98 IN | WEIGHT: 214.25 LBS

## 2020-12-19 VITALS — SYSTOLIC BLOOD PRESSURE: 98 MMHG | DIASTOLIC BLOOD PRESSURE: 62 MMHG

## 2020-12-19 VITALS — DIASTOLIC BLOOD PRESSURE: 27 MMHG

## 2020-12-19 VITALS — DIASTOLIC BLOOD PRESSURE: 58 MMHG

## 2020-12-19 VITALS — DIASTOLIC BLOOD PRESSURE: 52 MMHG

## 2020-12-19 VITALS — DIASTOLIC BLOOD PRESSURE: 46 MMHG | SYSTOLIC BLOOD PRESSURE: 99 MMHG

## 2020-12-19 VITALS — SYSTOLIC BLOOD PRESSURE: 80 MMHG | DIASTOLIC BLOOD PRESSURE: 46 MMHG

## 2020-12-19 VITALS — SYSTOLIC BLOOD PRESSURE: 96 MMHG | DIASTOLIC BLOOD PRESSURE: 54 MMHG

## 2020-12-19 DIAGNOSIS — E43: ICD-10-CM

## 2020-12-19 DIAGNOSIS — I25.2: ICD-10-CM

## 2020-12-19 DIAGNOSIS — Z95.5: ICD-10-CM

## 2020-12-19 DIAGNOSIS — I25.10: ICD-10-CM

## 2020-12-19 DIAGNOSIS — I48.21: ICD-10-CM

## 2020-12-19 DIAGNOSIS — U07.1: ICD-10-CM

## 2020-12-19 DIAGNOSIS — G25.81: ICD-10-CM

## 2020-12-19 DIAGNOSIS — A41.89: Primary | ICD-10-CM

## 2020-12-19 DIAGNOSIS — J12.89: ICD-10-CM

## 2020-12-19 DIAGNOSIS — Z66: ICD-10-CM

## 2020-12-19 DIAGNOSIS — Z98.51: ICD-10-CM

## 2020-12-19 DIAGNOSIS — I95.9: ICD-10-CM

## 2020-12-19 DIAGNOSIS — D68.59: ICD-10-CM

## 2020-12-19 DIAGNOSIS — K21.9: ICD-10-CM

## 2020-12-19 DIAGNOSIS — J43.9: ICD-10-CM

## 2020-12-19 DIAGNOSIS — E87.1: ICD-10-CM

## 2020-12-19 DIAGNOSIS — D63.8: ICD-10-CM

## 2020-12-19 DIAGNOSIS — I13.2: ICD-10-CM

## 2020-12-19 DIAGNOSIS — J96.00: ICD-10-CM

## 2020-12-19 DIAGNOSIS — Z91.040: ICD-10-CM

## 2020-12-19 DIAGNOSIS — Z51.5: ICD-10-CM

## 2020-12-19 DIAGNOSIS — Z95.1: ICD-10-CM

## 2020-12-19 DIAGNOSIS — N18.6: ICD-10-CM

## 2020-12-19 DIAGNOSIS — Z84.1: ICD-10-CM

## 2020-12-19 DIAGNOSIS — I50.33: ICD-10-CM

## 2020-12-19 DIAGNOSIS — Z88.8: ICD-10-CM

## 2020-12-19 DIAGNOSIS — D53.9: ICD-10-CM

## 2020-12-19 DIAGNOSIS — Z95.2: ICD-10-CM

## 2020-12-19 DIAGNOSIS — I77.0: ICD-10-CM

## 2020-12-19 DIAGNOSIS — I73.9: ICD-10-CM

## 2020-12-19 DIAGNOSIS — Z87.891: ICD-10-CM

## 2020-12-19 DIAGNOSIS — E66.01: ICD-10-CM

## 2020-12-19 DIAGNOSIS — E78.5: ICD-10-CM

## 2020-12-19 LAB
ALBUMIN SERPL-MCNC: 2.7 GM/DL (ref 3.1–4.5)
ALP SERPL-CCNC: 73 U/L (ref 45–117)
ALT SERPL W P-5'-P-CCNC: 17 U/L (ref 12–78)
APTT PPP: 29.6 SECONDS (ref 20–32.1)
AST SERPL-CCNC: 41 IU/L (ref 3–35)
BASE EXCESS BLDA CALC-SCNC: 4 MMOL/L (ref -2–2)
BASE EXCESS BLDA CALC-SCNC: 4.2 MMOL/L (ref -2–2)
BUN SERPL-MCNC: 41 MG/DL (ref 7–24)
CHLORIDE SERPL-SCNC: 96 MMOL/L (ref 98–107)
CK SERPL-CCNC: 143 U/L (ref 26–192)
CREAT SERPL-MCNC: 8.99 MG/DL (ref 0.55–1.02)
ERYTHROCYTE [DISTWIDTH] IN BLOOD BY AUTOMATED COUNT: 15.7 % (ref 0–14.5)
HCT VFR BLD AUTO: 37.2 % (ref 37–47)
INR BLD: 1.1 (ref 2–3.5)
LDH SERPL-CCNC: 549 U/L (ref 84–246)
MACROCYTES BLD QL SMEAR: (no result)
MCH RBC QN AUTO: 31.9 PG (ref 27–31)
MCHC RBC AUTO-ENTMCNC: 30.9 G/DL (ref 33–37)
MCV RBC AUTO: 103 FL (ref 81–99)
NRBC BLD QL AUTO: 0 10*3/UL (ref 0–0)
PCO2 BLDA: 41 MMHG (ref 35–45)
PCO2 BLDA: 51 MMHG (ref 35–45)
PH BLDA: 7.38 [PH] (ref 7.35–7.45)
PH BLDA: 7.45 [PH] (ref 7.35–7.45)
PLATELET # BLD AUTO: 236 10*3/UL (ref 130–400)
PLATELET SUFFICIENCY: NORMAL
PMV BLD AUTO: 11.7 FL (ref 9.6–12.3)
PO2 BLDA: 132 MMHG (ref 80–90)
PO2 BLDA: 68 MMHG (ref 80–90)
POTASSIUM SERPL-SCNC: 4.3 MMOL/L (ref 3.5–5.1)
PROT SERPL-MCNC: 6.9 GM/DL (ref 6.4–8.2)
RBC # BLD AUTO: 3.61 10*6/UL (ref 4.1–5.1)
SAO2 % BLDA: 92 % (ref 95–97)
SAO2 % BLDA: 99 % (ref 95–97)
SODIUM SERPL-SCNC: 136 MMOL/L (ref 136–145)
TOTAL CELLS COUNTED: 100 #CELLS
TOXIC GRANULES BLD QL SMEAR: SLIGHT
TROPONIN I SERPL-MCNC: < 0.015 NG/ML (ref ?–0.04)
VACUOLATION OF NEUTROPHILS: SLIGHT
WBC NRBC COR # BLD AUTO: 5.5 10*3/UL (ref 4.8–10.8)

## 2020-12-19 PROCEDURE — 5A09357 ASSISTANCE WITH RESPIRATORY VENTILATION, LESS THAN 24 CONSECUTIVE HOURS, CONTINUOUS POSITIVE AIRWAY PRESSURE: ICD-10-PCS | Performed by: INTERNAL MEDICINE

## 2020-12-19 NOTE — NUR
PT'S DAUGHTER ALEX UPDATED PER PT REQUEST,PT REMAINS W/O ACUTE DISTRESS NOTED
AWAITING [facility] ADMISSION,SAFETY PRECAUTIONS INTACT AND CALL LIGHT WITHIN
REACH,WILL CONTINUE TO MONITOR.

## 2020-12-19 NOTE — NUR
DR NICHOLS NOTIFIED OF PTS BR, WANTS ME TO CALL RENAL, DR KAHN NOTIFIED AND PT
STARTED ON MIDODRINE PER ORDER

## 2020-12-20 VITALS — DIASTOLIC BLOOD PRESSURE: 41 MMHG | SYSTOLIC BLOOD PRESSURE: 103 MMHG

## 2020-12-20 VITALS — SYSTOLIC BLOOD PRESSURE: 103 MMHG | DIASTOLIC BLOOD PRESSURE: 37 MMHG

## 2020-12-20 VITALS — DIASTOLIC BLOOD PRESSURE: 37 MMHG | SYSTOLIC BLOOD PRESSURE: 101 MMHG

## 2020-12-20 VITALS — DIASTOLIC BLOOD PRESSURE: 39 MMHG | SYSTOLIC BLOOD PRESSURE: 108 MMHG

## 2020-12-20 VITALS — DIASTOLIC BLOOD PRESSURE: 42 MMHG

## 2020-12-20 VITALS — DIASTOLIC BLOOD PRESSURE: 52 MMHG | SYSTOLIC BLOOD PRESSURE: 96 MMHG

## 2020-12-20 LAB
25(OH)D3 SERPL-MCNC: 24.7 NG/ML (ref 30–100)
ALBUMIN SERPL-MCNC: 2.2 GM/DL (ref 3.1–4.5)
ALP SERPL-CCNC: 66 U/L (ref 45–117)
ALT SERPL W P-5'-P-CCNC: 15 U/L (ref 12–78)
AST SERPL-CCNC: 36 IU/L (ref 3–35)
BASE EXCESS BLDA CALC-SCNC: 2.6 MMOL/L (ref -2–2)
BASE EXCESS BLDA CALC-SCNC: 3.5 MMOL/L (ref -2–2)
BUN SERPL-MCNC: 58 MG/DL (ref 7–24)
CHLORIDE SERPL-SCNC: 95 MMOL/L (ref 98–107)
CREAT SERPL-MCNC: 10.9 MG/DL (ref 0.55–1.02)
ERYTHROCYTE [DISTWIDTH] IN BLOOD BY AUTOMATED COUNT: 15.6 % (ref 0–14.5)
FERRITIN SERPL-MCNC: 7802.1 NG/ML (ref 10–291)
HCT VFR BLD AUTO: 35.2 % (ref 37–47)
LDH SERPL-CCNC: 452 U/L (ref 84–246)
MACROCYTES BLD QL SMEAR: SLIGHT
MCH RBC QN AUTO: 32.5 PG (ref 27–31)
MCHC RBC AUTO-ENTMCNC: 31.8 G/DL (ref 33–37)
MCV RBC AUTO: 102 FL (ref 81–99)
NRBC BLD QL AUTO: 0 % (ref 0–0)
PCO2 BLDA: 49 MMHG (ref 35–45)
PCO2 BLDA: 49 MMHG (ref 35–45)
PH BLDA: 7.38 [PH] (ref 7.35–7.45)
PH BLDA: 7.38 [PH] (ref 7.35–7.45)
PLATELET # BLD AUTO: 278 10*3/UL (ref 130–400)
PLATELET SUFFICIENCY: NORMAL
PMV BLD AUTO: 11.4 FL (ref 9.6–12.3)
PO2 BLDA: 63 MMHG (ref 80–90)
PO2 BLDA: 64 MMHG (ref 80–90)
POTASSIUM SERPL-SCNC: 5 MMOL/L (ref 3.5–5.1)
PROT SERPL-MCNC: 7 GM/DL (ref 6.4–8.2)
RBC # BLD AUTO: 3.45 10*6/UL (ref 4.1–5.1)
SAO2 % BLDA: 91 % (ref 95–97)
SAO2 % BLDA: 93 % (ref 95–97)
SODIUM SERPL-SCNC: 134 MMOL/L (ref 136–145)
TOTAL CELLS COUNTED: 100 #CELLS
WBC NRBC COR # BLD AUTO: 6.1 10*3/UL (ref 4.8–10.8)

## 2020-12-20 PROCEDURE — 5A09357 ASSISTANCE WITH RESPIRATORY VENTILATION, LESS THAN 24 CONSECUTIVE HOURS, CONTINUOUS POSITIVE AIRWAY PRESSURE: ICD-10-PCS | Performed by: INTERNAL MEDICINE

## 2020-12-20 PROCEDURE — 03HY32Z INSERTION OF MONITORING DEVICE INTO UPPER ARTERY, PERCUTANEOUS APPROACH: ICD-10-PCS | Performed by: NURSE ANESTHETIST, CERTIFIED REGISTERED

## 2020-12-20 PROCEDURE — 5A0935A ASSISTANCE WITH RESPIRATORY VENTILATION, LESS THAN 24 CONSECUTIVE HOURS, HIGH NASAL FLOW/VELOCITY: ICD-10-PCS | Performed by: INTERNAL MEDICINE

## 2020-12-20 NOTE — NUR
PT RESTING COMFORTABLY ON 4 L NC. SPO2 94% ON 4 L.
O2 DECREASED TO 4 L FROM 6 BY RN. SPO2 96%
ON 6 L.  ABG DRAWN ON 4 L NC WITH THE ASSISTANCE OF ULTRASOUND.

## 2020-12-20 NOTE — NUR
PT PLACED ON BIPAP AT THIS
TIME. PT TOLERATING WELL. SYSTEM CHECKED AND FX'ING. RESPS REGULAR AND
UNLABORED AT 22.

## 2020-12-21 VITALS — DIASTOLIC BLOOD PRESSURE: 38 MMHG

## 2020-12-21 VITALS — DIASTOLIC BLOOD PRESSURE: 38 MMHG | SYSTOLIC BLOOD PRESSURE: 116 MMHG

## 2020-12-21 VITALS — SYSTOLIC BLOOD PRESSURE: 125 MMHG | DIASTOLIC BLOOD PRESSURE: 49 MMHG

## 2020-12-21 VITALS — DIASTOLIC BLOOD PRESSURE: 56 MMHG

## 2020-12-21 LAB
ALBUMIN SERPL-MCNC: 2.1 GM/DL (ref 3.1–4.5)
ALP SERPL-CCNC: 81 U/L (ref 45–117)
ALT SERPL W P-5'-P-CCNC: 16 U/L (ref 12–78)
APTT PPP: 75.4 SECONDS (ref 20–32.1)
AST SERPL-CCNC: 27 IU/L (ref 3–35)
BASE EXCESS BLDA CALC-SCNC: -0.4 MMOL/L (ref -2–2)
BUN SERPL-MCNC: 84 MG/DL (ref 7–24)
CHLORIDE SERPL-SCNC: 93 MMOL/L (ref 98–107)
CREAT SERPL-MCNC: 12.1 MG/DL (ref 0.55–1.02)
ERYTHROCYTE [DISTWIDTH] IN BLOOD BY AUTOMATED COUNT: 15.6 % (ref 0–14.5)
HCT VFR BLD AUTO: 34.5 % (ref 37–47)
LDH SERPL-CCNC: 387 U/L (ref 84–246)
MCH RBC QN AUTO: 32.2 PG (ref 27–31)
MCHC RBC AUTO-ENTMCNC: 31.9 G/DL (ref 33–37)
MCV RBC AUTO: 100.9 FL (ref 81–99)
NRBC BLD QL AUTO: 0 % (ref 0–0)
PCO2 BLDA: 45 MMHG (ref 35–45)
PH BLDA: 7.36 [PH] (ref 7.35–7.45)
PLATELET # BLD AUTO: 315 10*3/UL (ref 130–400)
PLATELET SUFFICIENCY: NORMAL
PMV BLD AUTO: 11.1 FL (ref 9.6–12.3)
PO2 BLDA: 55 MMHG (ref 80–90)
POTASSIUM SERPL-SCNC: 5.5 MMOL/L (ref 3.5–5.1)
PROT SERPL-MCNC: 6.8 GM/DL (ref 6.4–8.2)
RBC # BLD AUTO: 3.42 10*6/UL (ref 4.1–5.1)
RBC MORPH BLD: NORMAL
SAO2 % BLDA: 87 % (ref 95–97)
SODIUM SERPL-SCNC: 132 MMOL/L (ref 136–145)
TOTAL CELLS COUNTED: 100 #CELLS
WBC NRBC COR # BLD AUTO: 7.1 10*3/UL (ref 4.8–10.8)

## 2020-12-21 PROCEDURE — 5A0935A ASSISTANCE WITH RESPIRATORY VENTILATION, LESS THAN 24 CONSECUTIVE HOURS, HIGH NASAL FLOW/VELOCITY: ICD-10-PCS | Performed by: INTERNAL MEDICINE

## 2020-12-21 PROCEDURE — 5A1D70Z PERFORMANCE OF URINARY FILTRATION, INTERMITTENT, LESS THAN 6 HOURS PER DAY: ICD-10-PCS | Performed by: INTERNAL MEDICINE

## 2020-12-21 PROCEDURE — 5A09357 ASSISTANCE WITH RESPIRATORY VENTILATION, LESS THAN 24 CONSECUTIVE HOURS, CONTINUOUS POSITIVE AIRWAY PRESSURE: ICD-10-PCS | Performed by: INTERNAL MEDICINE

## 2020-12-21 NOTE — NUR
BIPAP WAS PLACED ONTO PATIENT AT THIS TIME. TOLERATING WELL. SINCE EARLIER
ABGS WERE DRAWN ON 4LNC, WE WILL OBTAIN NEW ABG AFTER PATIENT WEARS THE BIPAP
FOR 2 HOURS.

## 2020-12-21 NOTE — NUR
PHYSICAL THERAPY
 
PT order received when patient was admitted to [facility]-. [facility]. Please send new PT orders when medically appropriate.
Thank you. Vivi Acosta,PT,DPT

## 2020-12-21 NOTE — NUR
PATIENT RESTING IN BED WITH NO S/S OF DISTRESS. NO BLEEDING FROM PREVIOUS ART
LINE SITE NOTED. PRESSURE DRESSING DRY AND INTACT. BED IN LOWEST POSITION,
CALL LIGHT IN REACH

## 2020-12-21 NOTE — NUR
BIPAP ALARMING, PATIENT HAS TAKEN IT OFF AND IS CURRENTLY WEARING NO OXYGEN,
O2 WAS APPLIED BY THIS RN AND PATIENT WAS EDUCATED ON NEED AND IMPORTANCE OF
WEARING OXYGEN BY EITHER NC OR BIPAP.
PATIENT VERBALIZED UNDERSTANDING.
RN WILL CONTINUE TO MONITOR

## 2020-12-21 NOTE — NUR
OT NOTE
Occupational therapy order received when patient was admitted to [facility].
Patient has since been transferred to the [facility]. Will need new orders when
medically appropriate. Thank you.
 
India Beasley, OTR/L

## 2020-12-21 NOTE — NUR
spoke to patient's grandson via phone.
Patient lives at home alone with a family member staying with her
occasionally.
There are 0 steps in the home.
Physician: Jessica Francisco
Pharmacy: [facility]
Home health services: none
Patient's level of ADLs: minimal assistance
Patient has working utilities: yes
DME: walker, cane x 2
Follow-up physician's appointment after d/c: will be made by the hospitalist
nurse director upon discharge
Does patient want to access PORTAL?: no
Discharge plan discussed with patient's grandson.  Attempted to reach
patient's daughter, Carmelina, but she is at work until 5pm.  Patient lives at
home alone with a family member staying with her occasionally.  She is
normally independent in her ADLs and ambulation but has a cane and a walker if
needed.  Discussed short term SNF and home health care services and the
grandson doesn't think she will need anything at this time.  CM will
continue to follow for any discharge planning needs.  She is HD MWF, chair
time 6am, and she normally drives herself.  When medically stable she will be
discharged to home.  Either her daughter or granddaughter will provide
transportation on discharge.
 
ASHLEY HUGHES

## 2020-12-21 NOTE — NUR
Patient is currently in dialysis. Zuly HINDS caring for patient states that she
will notify this nurse when patient is out of dialysis.

## 2020-12-21 NOTE — NUR
PHYSICAL THERAPY
 
Physical Therapy evaluation completed on [facility] with full evaluation to follow.
Moderate complexity PT evaluation per chart review and evaluation, [id number].
Recommend physical therapy per plan of care and SNF vs Home with home health
pending progression upon discharge.  Thank you for this referral. Vivi Acosta,PT,DPT

## 2020-12-21 NOTE — NUR
Occupational Therapy evaluation completed on [facility] with full evaluation to
follow.  Recommend occupational therapy per plan of care and SNF vs home with
HH with 24/7 supervision assist upon discharge.  Thank you for this referral.
 
NEELAM Garvey/L

## 2020-12-22 VITALS — SYSTOLIC BLOOD PRESSURE: 143 MMHG | DIASTOLIC BLOOD PRESSURE: 61 MMHG

## 2020-12-22 VITALS — SYSTOLIC BLOOD PRESSURE: 141 MMHG | DIASTOLIC BLOOD PRESSURE: 52 MMHG

## 2020-12-22 VITALS — DIASTOLIC BLOOD PRESSURE: 38 MMHG | SYSTOLIC BLOOD PRESSURE: 128 MMHG

## 2020-12-22 VITALS — DIASTOLIC BLOOD PRESSURE: 38 MMHG | SYSTOLIC BLOOD PRESSURE: 121 MMHG

## 2020-12-22 VITALS — DIASTOLIC BLOOD PRESSURE: 59 MMHG

## 2020-12-22 LAB
ALBUMIN SERPL-MCNC: 2.3 GM/DL (ref 3.1–4.5)
ALP SERPL-CCNC: 79 U/L (ref 45–117)
ALT SERPL W P-5'-P-CCNC: 14 U/L (ref 12–78)
APTT PPP: 47.6 SECONDS (ref 20–32.1)
AST SERPL-CCNC: 27 IU/L (ref 3–35)
BASE EXCESS BLDA CALC-SCNC: 0.9 MMOL/L (ref -2–2)
BUN SERPL-MCNC: 64 MG/DL (ref 7–24)
BURR CELLS BLD QL SMEAR: (no result)
CHLORIDE SERPL-SCNC: 95 MMOL/L (ref 98–107)
CREAT SERPL-MCNC: 8.43 MG/DL (ref 0.55–1.02)
ERYTHROCYTE [DISTWIDTH] IN BLOOD BY AUTOMATED COUNT: 15.5 % (ref 0–14.5)
HCT VFR BLD AUTO: 36.9 % (ref 37–47)
LDH SERPL-CCNC: 350 U/L (ref 84–246)
MACROCYTES BLD QL SMEAR: SLIGHT
MCH RBC QN AUTO: 31.8 PG (ref 27–31)
MCHC RBC AUTO-ENTMCNC: 31.2 G/DL (ref 33–37)
MCV RBC AUTO: 101.9 FL (ref 81–99)
NRBC BLD QL AUTO: 0 % (ref 0–0)
PCO2 BLDA: 49 MMHG (ref 35–45)
PH BLDA: 7.36 [PH] (ref 7.35–7.45)
PLATELET # BLD AUTO: 290 10*3/UL (ref 130–400)
PLATELET SUFFICIENCY: NORMAL
PMV BLD AUTO: 10.8 FL (ref 9.6–12.3)
PO2 BLDA: 133 MMHG (ref 80–90)
POTASSIUM SERPL-SCNC: 4.9 MMOL/L (ref 3.5–5.1)
PROT SERPL-MCNC: 7.1 GM/DL (ref 6.4–8.2)
RBC # BLD AUTO: 3.62 10*6/UL (ref 4.1–5.1)
ROULEAUX BLD QL SMEAR: SLIGHT
SAO2 % BLDA: 99 % (ref 95–97)
SODIUM SERPL-SCNC: 134 MMOL/L (ref 136–145)
TOTAL CELLS COUNTED: 100 #CELLS
VARIANT LYMPHS NFR BLD MANUAL: 6 % (ref 0–0)
WBC NRBC COR # BLD AUTO: 6.4 10*3/UL (ref 4.8–10.8)

## 2020-12-22 PROCEDURE — 5A09357 ASSISTANCE WITH RESPIRATORY VENTILATION, LESS THAN 24 CONSECUTIVE HOURS, CONTINUOUS POSITIVE AIRWAY PRESSURE: ICD-10-PCS | Performed by: INTERNAL MEDICINE

## 2020-12-22 NOTE — NUR
CALL PLACED TO HOSPITALIST LINE ADVISED THAT DR. CHEN WAS IN TO SEE PATIENT
PATIENT HAVING INCREASED BLEEDING FROM HEMORRHOIDS, DR. BETTENCOURT ADVISED TO
HOLD FOR NOW.

## 2020-12-22 NOTE — NUR
PHYSICAL THERAPY
 
Patient seen this am 1:1 for therapy visit and was sitting up in bedside chair
upon therapist arrival. Patient identified by name /  and joined by OT
assistant for observation this session. Patient presented with continuos IV
treatment, O2-6L via NC and recorded resting SpO2 97%, HR 71 bpm. Patient
performed several sit to stand transfers at chair side, SBA, demonstrating
slow initial rise, tolerating approx 1 minute static stand each trial. Patient
ambulates without AD, HHA/CGA, ad heraclio in room with extended O2 cord, 15'x 2,
demonstrating slow, cautious gait pattern. Patient Patient recorded SpO2 98%,
HR 87 bpm following gait ex and returned to supine in bed with mild fatigue.
Patient remained in bed with call light, tray table, telephone and bed alarm
for safety.  Will continue per POC as tolerated, total treatment time 18
minutes.      Carmine Parmar, PTA

## 2020-12-22 NOTE — NUR
OT NOTE
Pt was seen this P.M. 1:1 for 25 minute OT session. Upon arrival pt was
sitting upright in the recliner. Pt identified by name and  and had no
complaints at this time. Pt presented to therapy with continuous 6L-O2 high
flow which she remained on throughout the entire session. Pt's resting SpO2
reading 94% and heart rate 67 bpm. Sit to stand completed from the chair level
with SBA. Challenged pt's static standing tolerance needed for increased I in
self care tasks and functional transfers. Pt was able to tolerate aprox 3
minutes before sitting due to fatigue. Throughout pt's SpO2 reading 97% and
heart rate 71 bpm. Functional mobility completed around the room to the EOB
with SBA, throughout SpO2 reading 98% and heart rate 87 bpm. Pt then completed
bedside commode transfer. There she transferred on/off bedside commode with
SBA and back to the EOB with SBA. While sitting EOB requested for pt to doff
and leroy B socks and pt was able to complete idependent. Throughout entire
session pt was educated on energy conservation techniques for increased I and
enhanced safety. Pt voiced understanding. Pt then transferred back into bed
sit to supine with SBA. There she was left with call light in hand, tray table
in place, and phone in reach. Continue with POC as able. Throughout entire
session airborne precautions were maintained.
 
PRANAV Vaca

## 2020-12-23 VITALS — SYSTOLIC BLOOD PRESSURE: 131 MMHG | DIASTOLIC BLOOD PRESSURE: 56 MMHG

## 2020-12-23 VITALS — SYSTOLIC BLOOD PRESSURE: 110 MMHG | DIASTOLIC BLOOD PRESSURE: 40 MMHG

## 2020-12-23 VITALS — SYSTOLIC BLOOD PRESSURE: 147 MMHG | DIASTOLIC BLOOD PRESSURE: 72 MMHG

## 2020-12-23 VITALS — DIASTOLIC BLOOD PRESSURE: 63 MMHG

## 2020-12-23 VITALS — DIASTOLIC BLOOD PRESSURE: 61 MMHG

## 2020-12-23 LAB
ALBUMIN SERPL-MCNC: 2.4 GM/DL (ref 3.1–4.5)
ALP SERPL-CCNC: 84 U/L (ref 45–117)
ALT SERPL W P-5'-P-CCNC: 16 U/L (ref 12–78)
APTT PPP: 25.4 SECONDS (ref 20–32.1)
AST SERPL-CCNC: 24 IU/L (ref 3–35)
BASE EXCESS BLDA CALC-SCNC: -0.3 MMOL/L (ref -2–2)
BUN SERPL-MCNC: 91 MG/DL (ref 7–24)
CHLORIDE SERPL-SCNC: 93 MMOL/L (ref 98–107)
CREAT SERPL-MCNC: 9.68 MG/DL (ref 0.55–1.02)
ERYTHROCYTE [DISTWIDTH] IN BLOOD BY AUTOMATED COUNT: 15.5 % (ref 0–14.5)
HCT VFR BLD AUTO: 36.9 % (ref 37–47)
MACROCYTES BLD QL SMEAR: SLIGHT
MCH RBC QN AUTO: 32.7 PG (ref 27–31)
MCHC RBC AUTO-ENTMCNC: 31.7 G/DL (ref 33–37)
MCV RBC AUTO: 103.1 FL (ref 81–99)
NRBC BLD QL AUTO: 0 % (ref 0–0)
PCO2 BLDA: 47 MMHG (ref 35–45)
PH BLDA: 7.35 [PH] (ref 7.35–7.45)
PLATELET # BLD AUTO: 267 10*3/UL (ref 130–400)
PLATELET SUFFICIENCY: NORMAL
PMV BLD AUTO: 10.6 FL (ref 9.6–12.3)
PO2 BLDA: 79 MMHG (ref 80–90)
POLYCHROMASIA BLD QL SMEAR: SLIGHT
POTASSIUM SERPL-SCNC: 6 MMOL/L (ref 3.5–5.1)
PROT SERPL-MCNC: 6.7 GM/DL (ref 6.4–8.2)
RBC # BLD AUTO: 3.58 10*6/UL (ref 4.1–5.1)
ROULEAUX BLD QL SMEAR: SLIGHT
SAO2 % BLDA: 95 % (ref 95–97)
SODIUM SERPL-SCNC: 132 MMOL/L (ref 136–145)
TOTAL CELLS COUNTED: 100 #CELLS
VARIANT LYMPHS NFR BLD MANUAL: 1 % (ref 0–0)
WBC NRBC COR # BLD AUTO: 8.2 10*3/UL (ref 4.8–10.8)

## 2020-12-23 PROCEDURE — 5A09357 ASSISTANCE WITH RESPIRATORY VENTILATION, LESS THAN 24 CONSECUTIVE HOURS, CONTINUOUS POSITIVE AIRWAY PRESSURE: ICD-10-PCS | Performed by: INTERNAL MEDICINE

## 2020-12-23 PROCEDURE — 5A1D70Z PERFORMANCE OF URINARY FILTRATION, INTERMITTENT, LESS THAN 6 HOURS PER DAY: ICD-10-PCS | Performed by: INTERNAL MEDICINE

## 2020-12-23 NOTE — NUR
OT NOTE
Pt was seen this P.M. 1:1 for 25 minute OT session. Upon arrival pt was supine
in bed with no bed alarm activated. Pt presented to therapy with continuous
6L-O2 via high flow which she remained on throughout the entire session. Pt
transferred supine to sit EOB with SBA. WHile sitting EOB pt donned B socks
with supervision. Sit to stand completed from bed level with SBA followed by
functional mobility to the bedside commode with SBA for safety. Pt transferred
on/off bedside commode with SBA. Then challenged pt's dynamic standing balance
while weight shifting, crossing midline, and reaching over all planes. Pt was
able to maintain F+/G- standing balance throughout. Pt then returned to the
EOB. While sitting EOB pt completed upper and lower body bathing MI. Pt was
left sitting upright on the EOB with call light in hand, tray table in place,
and phone in reach. Throughout entire session pt's SpO2 was within functional
limits and airborne precautions maintained. Continue with POC as able.
 
TAE Vaca/LINDA

## 2020-12-23 NOTE — NUR
SPOKE WITH DR BETTENCOURT RE: HEPARIN DRIP ON HOLD, RE: BLEEDING HEMORRHOID
KEEP ON HOLD TILL TOMORROW. D DIMER ORDERED FOR AM. WILL REASSESS THEN.

## 2020-12-23 NOTE — NUR
PHYSICAL THERAPY
 
Patient was out of her room for Dialysis this am and not available for therapy
at this time. Will continue per POC as able.  Carmine Parmar, PTA

## 2020-12-24 VITALS — SYSTOLIC BLOOD PRESSURE: 96 MMHG | DIASTOLIC BLOOD PRESSURE: 68 MMHG

## 2020-12-24 VITALS — DIASTOLIC BLOOD PRESSURE: 47 MMHG

## 2020-12-24 VITALS — DIASTOLIC BLOOD PRESSURE: 41 MMHG

## 2020-12-24 VITALS — DIASTOLIC BLOOD PRESSURE: 68 MMHG

## 2020-12-24 LAB
ALBUMIN SERPL-MCNC: 2.6 GM/DL (ref 3.1–4.5)
ALP SERPL-CCNC: 81 U/L (ref 45–117)
ALT SERPL W P-5'-P-CCNC: 29 U/L (ref 12–78)
APTT PPP: 27.5 SECONDS (ref 20–32.1)
AST SERPL-CCNC: 28 IU/L (ref 3–35)
BASE EXCESS BLDA CALC-SCNC: 2.6 MMOL/L (ref -2–2)
BUN SERPL-MCNC: 105 MG/DL (ref 7–24)
BUN SERPL-MCNC: 109 MG/DL (ref 7–24)
BUN SERPL-MCNC: 92 MG/DL (ref 7–24)
CHLORIDE SERPL-SCNC: 94 MMOL/L (ref 98–107)
CHLORIDE SERPL-SCNC: 94 MMOL/L (ref 98–107)
CHLORIDE SERPL-SCNC: 95 MMOL/L (ref 98–107)
CREAT SERPL-MCNC: 10.2 MG/DL (ref 0.55–1.02)
CREAT SERPL-MCNC: 9.04 MG/DL (ref 0.55–1.02)
CREAT SERPL-MCNC: 9.66 MG/DL (ref 0.55–1.02)
ERYTHROCYTE [DISTWIDTH] IN BLOOD BY AUTOMATED COUNT: 15.1 % (ref 0–14.5)
HCT VFR BLD AUTO: 36.4 % (ref 37–47)
MACROCYTES BLD QL SMEAR: SLIGHT
MCH RBC QN AUTO: 32.5 PG (ref 27–31)
MCHC RBC AUTO-ENTMCNC: 31.6 G/DL (ref 33–37)
MCV RBC AUTO: 102.8 FL (ref 81–99)
NRBC BLD QL AUTO: 0 10*3/UL (ref 0–0)
OVALOCYTES BLD QL SMEAR: (no result)
PCO2 BLDA: 51 MMHG (ref 35–45)
PH BLDA: 7.36 [PH] (ref 7.35–7.45)
PLATELET # BLD AUTO: 262 10*3/UL (ref 130–400)
PLATELET SUFFICIENCY: NORMAL
PMV BLD AUTO: 10.4 FL (ref 9.6–12.3)
PO2 BLDA: 47 MMHG (ref 80–90)
POTASSIUM SERPL-SCNC: 5.7 MMOL/L (ref 3.5–5.1)
POTASSIUM SERPL-SCNC: 5.8 MMOL/L (ref 3.5–5.1)
POTASSIUM SERPL-SCNC: 6 MMOL/L (ref 3.5–5.1)
PROT SERPL-MCNC: 7 GM/DL (ref 6.4–8.2)
RBC # BLD AUTO: 3.54 10*6/UL (ref 4.1–5.1)
ROULEAUX BLD QL SMEAR: SLIGHT
SAO2 % BLDA: 85 % (ref 95–97)
SODIUM SERPL-SCNC: 130 MMOL/L (ref 136–145)
SODIUM SERPL-SCNC: 130 MMOL/L (ref 136–145)
SODIUM SERPL-SCNC: 131 MMOL/L (ref 136–145)
TOTAL CELLS COUNTED: 100 #CELLS
VARIANT LYMPHS NFR BLD MANUAL: 3 % (ref 0–0)
WBC NRBC COR # BLD AUTO: 9.3 10*3/UL (ref 4.8–10.8)

## 2020-12-24 PROCEDURE — 5A09357 ASSISTANCE WITH RESPIRATORY VENTILATION, LESS THAN 24 CONSECUTIVE HOURS, CONTINUOUS POSITIVE AIRWAY PRESSURE: ICD-10-PCS | Performed by: INTERNAL MEDICINE

## 2020-12-24 NOTE — NUR
OT NOTE
Pt was seen this P.M. 1:1 for 20 minute OT session. Upon arrival pt was supine
in bed. Pt identified by name and  and had no complaints at this time. Pt
presented to therapy with continuous 3L-O2 via NC which she remained on
throughout the entire session. Pt transferred supine to sit EOB with
supervision. While sitting EOB pt donned B socks with supervision while
bringing leg up to knee level. Sit to stand completed from bed level with SBA.
Challenged pt's dynamic standing balance while weight shifting, crossing
midline, and reaching over all planes. Pt was able to maintain F- standing
balance requiring UE support. Functional mobility was then completed to the
bathroom with SBA and fair safety with the O2 line/management. There she
transferred on/off standard commode with SBA followed by functional mobility
back to the EOB with SBA. Pt was left sitting upright on the EOB with call
ligh tin hand, tray table in place, and phone in reach. Continue with POC as
able. Throughout entire session airborne precautions were maintained.
 
TAE Vaca/LINDA

## 2020-12-24 NOTE — NUR
Attempted to reach patient via phone with no success. CM will continue to
follow for any discharge planning needs.

## 2020-12-24 NOTE — NUR
PHYSICAL THERAPY
 
Patient seen this am 1:1 for therapy visit and was sitting up on EOB upon
therapist arrival. Patient identified by name /  and joined by OT assistant
for observation only this session. Patient presented with continuos O2-3L via
NC, recording resting SpO2 97%, HR 60 bpm, prior to completing sit to stand
tranfer HHA/CGA. Patient ambulates ad heraclio in room 25'x 1, HHA/CGA, with
extended O2 cord, demonstrating slow, cautious gait pattern, with several
"wobbly" turns. Patient received v/c to improve B arm swing to promote
increased stride, improved dynamic balance and ambulated additional 40'x 1,
SBA, no LOB to bathroom and back to EOB sit with only mild fatigue. Patient
recorded SpO2 93%, HR 80 bpm following gait ex and remained EOB sit with call
light, tray table and cell phone. Will continue per POC as tolerated, total
treatment time 17 minutes.   Carmine Parmar, PTA

## 2020-12-25 VITALS — DIASTOLIC BLOOD PRESSURE: 62 MMHG

## 2020-12-25 VITALS — SYSTOLIC BLOOD PRESSURE: 142 MMHG | DIASTOLIC BLOOD PRESSURE: 56 MMHG

## 2020-12-25 VITALS — SYSTOLIC BLOOD PRESSURE: 154 MMHG | DIASTOLIC BLOOD PRESSURE: 63 MMHG

## 2020-12-25 VITALS — DIASTOLIC BLOOD PRESSURE: 65 MMHG

## 2020-12-25 VITALS — DIASTOLIC BLOOD PRESSURE: 63 MMHG

## 2020-12-25 LAB
ALBUMIN SERPL-MCNC: 2.6 GM/DL (ref 3.1–4.5)
ALP SERPL-CCNC: 79 U/L (ref 45–117)
ALT SERPL W P-5'-P-CCNC: 27 U/L (ref 12–78)
AST SERPL-CCNC: 23 IU/L (ref 3–35)
BUN SERPL-MCNC: 117 MG/DL (ref 7–24)
CHLORIDE SERPL-SCNC: 95 MMOL/L (ref 98–107)
CREAT SERPL-MCNC: 10.5 MG/DL (ref 0.55–1.02)
ERYTHROCYTE [DISTWIDTH] IN BLOOD BY AUTOMATED COUNT: 15.2 % (ref 0–14.5)
HCT VFR BLD AUTO: 35.6 % (ref 37–47)
MACROCYTES BLD QL SMEAR: SLIGHT
MCH RBC QN AUTO: 32.3 PG (ref 27–31)
MCHC RBC AUTO-ENTMCNC: 31.7 G/DL (ref 33–37)
MCV RBC AUTO: 101.7 FL (ref 81–99)
NRBC BLD QL AUTO: 0 10*3/UL (ref 0–0)
OVALOCYTES BLD QL SMEAR: (no result)
PLATELET # BLD AUTO: 250 10*3/UL (ref 130–400)
PLATELET SUFFICIENCY: NORMAL
PMV BLD AUTO: 10.4 FL (ref 9.6–12.3)
POTASSIUM SERPL-SCNC: 4.8 MMOL/L (ref 3.5–5.1)
PROT SERPL-MCNC: 6.6 GM/DL (ref 6.4–8.2)
RBC # BLD AUTO: 3.5 10*6/UL (ref 4.1–5.1)
SODIUM SERPL-SCNC: 134 MMOL/L (ref 136–145)
TOTAL CELLS COUNTED: 100 #CELLS
WBC NRBC COR # BLD AUTO: 11.4 10*3/UL (ref 4.8–10.8)

## 2020-12-25 PROCEDURE — 5A09357 ASSISTANCE WITH RESPIRATORY VENTILATION, LESS THAN 24 CONSECUTIVE HOURS, CONTINUOUS POSITIVE AIRWAY PRESSURE: ICD-10-PCS | Performed by: INTERNAL MEDICINE

## 2020-12-25 NOTE — NUR
PRN MORPHINE GIVEN FOR PT COMPLAINTS OF 10/10 BACK AND LEG PAIN. CALL LIGHT
WITHIN REACH, WILL MONITOR

## 2020-12-25 NOTE — NUR
ATTEMPTED TO TAKE PATIENT OFF 1L NC AS PATIENT HAS BEEN 96% ON 1L. WHEN TAKEN
OFF, PATIENT DROPPED TO 89%. PATIENT PLACED BACK ON 1L NC

## 2020-12-26 VITALS — DIASTOLIC BLOOD PRESSURE: 70 MMHG | SYSTOLIC BLOOD PRESSURE: 165 MMHG

## 2020-12-26 VITALS — DIASTOLIC BLOOD PRESSURE: 94 MMHG

## 2020-12-26 VITALS — DIASTOLIC BLOOD PRESSURE: 59 MMHG

## 2020-12-26 LAB
ALBUMIN SERPL-MCNC: 2.6 GM/DL (ref 3.1–4.5)
ALP SERPL-CCNC: 73 U/L (ref 45–117)
ALT SERPL W P-5'-P-CCNC: 33 U/L (ref 12–78)
AST SERPL-CCNC: 21 IU/L (ref 3–35)
BUN SERPL-MCNC: 145 MG/DL (ref 7–24)
CHLORIDE SERPL-SCNC: 93 MMOL/L (ref 98–107)
CREAT SERPL-MCNC: 11.7 MG/DL (ref 0.55–1.02)
ERYTHROCYTE [DISTWIDTH] IN BLOOD BY AUTOMATED COUNT: 15.1 % (ref 0–14.5)
HCT VFR BLD AUTO: 32.6 % (ref 37–47)
MACROCYTES BLD QL SMEAR: SLIGHT
MCH RBC QN AUTO: 31.9 PG (ref 27–31)
MCHC RBC AUTO-ENTMCNC: 31.9 G/DL (ref 33–37)
MCV RBC AUTO: 100 FL (ref 81–99)
NRBC BLD QL AUTO: 0 10*3/UL (ref 0–0)
PLATELET # BLD AUTO: 323 10*3/UL (ref 130–400)
PLATELET SUFFICIENCY: NORMAL
PMV BLD AUTO: 10.7 FL (ref 9.6–12.3)
POTASSIUM SERPL-SCNC: 4.8 MMOL/L (ref 3.5–5.1)
PROT SERPL-MCNC: 6.4 GM/DL (ref 6.4–8.2)
RBC # BLD AUTO: 3.26 10*6/UL (ref 4.1–5.1)
SODIUM SERPL-SCNC: 132 MMOL/L (ref 136–145)
TOTAL CELLS COUNTED: 100 #CELLS
WBC NRBC COR # BLD AUTO: 15.3 10*3/UL (ref 4.8–10.8)

## 2020-12-26 PROCEDURE — 5A09357 ASSISTANCE WITH RESPIRATORY VENTILATION, LESS THAN 24 CONSECUTIVE HOURS, CONTINUOUS POSITIVE AIRWAY PRESSURE: ICD-10-PCS | Performed by: INTERNAL MEDICINE

## 2020-12-26 PROCEDURE — 5A0935A ASSISTANCE WITH RESPIRATORY VENTILATION, LESS THAN 24 CONSECUTIVE HOURS, HIGH NASAL FLOW/VELOCITY: ICD-10-PCS | Performed by: INTERNAL MEDICINE

## 2020-12-26 PROCEDURE — 5A1D70Z PERFORMANCE OF URINARY FILTRATION, INTERMITTENT, LESS THAN 6 HOURS PER DAY: ICD-10-PCS | Performed by: INTERNAL MEDICINE

## 2020-12-26 NOTE — NUR
PATIENT REMOVED SELF FROM BIPAP. STATES SHE DOES NOT WANT TO GO BACK ON. NASAL
CANNULA ON PATIENT. 1L. PATIENT SATTING 97%.

## 2020-12-26 NOTE — NUR
Orders received for Home Health. Pt Provided with Choice List. [facility]. Referral Faxed to [facility]
[phone]. Face 2 Face obtained from Dr Patel. Faxed with Supporting
Documentation to [facility]. Spoke with On Call Nurse Terrence at [facility]
who states that she will call Raul. Provided with Discharge Planner Phone
Number.

## 2020-12-26 NOTE — NUR
Discharge instructions reviewed with patient/family. Patient receptive and
verbalizes understanding. Follow-up care arranged. Written instructions given
to patient/family.
ANGELITO REGAN

## 2020-12-26 NOTE — NUR
PATIENT ASSESED FOR 02. SPO2 100% ON 2L, ON ROOM AIR SPO2 WAS 95%. SPO2
DECREASED TO 91-92% ON ROOM AIR WHILE AMBULATING HR REMAINED STABLE AT 75-83.
RN NOTIFIEND PAT[facility]

## 2020-12-26 NOTE — NUR
Patient resting quietly with no c/o discomfort. Respirations easy and regular.
Vital signs stable. No overt distress.
ANGELITO REGAN R

## 2020-12-28 NOTE — NUR
PHYSICAL THERAPY CO-SIGN
 
 
I approve of the Physical Therapy notes written above.
 
                                ASHLEY STONE PT, DPT

## 2021-02-10 ENCOUNTER — HOSPITAL ENCOUNTER (INPATIENT)
Dept: HOSPITAL 83 - ED | Age: 66
LOS: 7 days | Discharge: NURSING, REHAB OR LONG-TERM CARE | DRG: 463 | End: 2021-02-17
Attending: INTERNAL MEDICINE | Admitting: INTERNAL MEDICINE
Payer: MEDICARE

## 2021-02-10 VITALS — WEIGHT: 210.5 LBS | HEIGHT: 63 IN | BODY MASS INDEX: 37.3 KG/M2

## 2021-02-10 VITALS — DIASTOLIC BLOOD PRESSURE: 58 MMHG

## 2021-02-10 VITALS — DIASTOLIC BLOOD PRESSURE: 50 MMHG

## 2021-02-10 DIAGNOSIS — I73.9: ICD-10-CM

## 2021-02-10 DIAGNOSIS — K21.9: ICD-10-CM

## 2021-02-10 DIAGNOSIS — I25.10: ICD-10-CM

## 2021-02-10 DIAGNOSIS — J44.9: ICD-10-CM

## 2021-02-10 DIAGNOSIS — Z66: ICD-10-CM

## 2021-02-10 DIAGNOSIS — L97.529: ICD-10-CM

## 2021-02-10 DIAGNOSIS — E83.39: ICD-10-CM

## 2021-02-10 DIAGNOSIS — I48.91: ICD-10-CM

## 2021-02-10 DIAGNOSIS — Z91.040: ICD-10-CM

## 2021-02-10 DIAGNOSIS — D53.9: ICD-10-CM

## 2021-02-10 DIAGNOSIS — Z20.822: ICD-10-CM

## 2021-02-10 DIAGNOSIS — N18.6: ICD-10-CM

## 2021-02-10 DIAGNOSIS — Z80.0: ICD-10-CM

## 2021-02-10 DIAGNOSIS — E83.41: ICD-10-CM

## 2021-02-10 DIAGNOSIS — E78.5: ICD-10-CM

## 2021-02-10 DIAGNOSIS — I13.2: ICD-10-CM

## 2021-02-10 DIAGNOSIS — Z79.82: ICD-10-CM

## 2021-02-10 DIAGNOSIS — Z95.1: ICD-10-CM

## 2021-02-10 DIAGNOSIS — Z87.891: ICD-10-CM

## 2021-02-10 DIAGNOSIS — I50.32: ICD-10-CM

## 2021-02-10 DIAGNOSIS — M84.475A: Primary | ICD-10-CM

## 2021-02-10 DIAGNOSIS — E66.01: ICD-10-CM

## 2021-02-10 DIAGNOSIS — E87.5: ICD-10-CM

## 2021-02-10 DIAGNOSIS — G25.81: ICD-10-CM

## 2021-02-10 DIAGNOSIS — Z79.899: ICD-10-CM

## 2021-02-10 DIAGNOSIS — M86.8X7: ICD-10-CM

## 2021-02-10 DIAGNOSIS — B96.1: ICD-10-CM

## 2021-02-10 DIAGNOSIS — E43: ICD-10-CM

## 2021-02-10 LAB
ALBUMIN SERPL-MCNC: 2.5 GM/DL (ref 3.1–4.5)
ALP SERPL-CCNC: 94 U/L (ref 45–117)
ALT SERPL W P-5'-P-CCNC: 14 U/L (ref 12–78)
AST SERPL-CCNC: 14 IU/L (ref 3–35)
BASOPHILS # BLD AUTO: 0 10*3/UL (ref 0–0.1)
BASOPHILS NFR BLD AUTO: 0.7 % (ref 0–1)
BUN SERPL-MCNC: 34 MG/DL (ref 7–24)
CHLORIDE SERPL-SCNC: 96 MMOL/L (ref 98–107)
CREAT SERPL-MCNC: 5.79 MG/DL (ref 0.55–1.02)
EOSINOPHIL # BLD AUTO: 0.2 10*3/UL (ref 0–0.4)
EOSINOPHIL # BLD AUTO: 4 % (ref 1–4)
ERYTHROCYTE [DISTWIDTH] IN BLOOD BY AUTOMATED COUNT: 17.2 % (ref 0–14.5)
HCT VFR BLD AUTO: 31.5 % (ref 37–47)
LIPASE SERPL-CCNC: 94 U/L (ref 73–393)
LYMPHOCYTES # BLD AUTO: 0.7 10*3/UL (ref 1.3–4.4)
LYMPHOCYTES NFR BLD AUTO: 11.7 % (ref 27–41)
MCH RBC QN AUTO: 31.9 PG (ref 27–31)
MCHC RBC AUTO-ENTMCNC: 30.5 G/DL (ref 33–37)
MCV RBC AUTO: 104.7 FL (ref 81–99)
MONOCYTES # BLD AUTO: 0.3 10*3/UL (ref 0.1–1)
MONOCYTES NFR BLD MANUAL: 5.6 % (ref 3–9)
NEUT #: 4.7 10*3/UL (ref 2.3–7.9)
NEUT %: 77.3 % (ref 47–73)
NRBC BLD QL AUTO: 0 10*3/UL (ref 0–0)
PLATELET # BLD AUTO: 378 10*3/UL (ref 130–400)
PMV BLD AUTO: 9.4 FL (ref 9.6–12.3)
POTASSIUM SERPL-SCNC: 5 MMOL/L (ref 3.5–5.1)
PROT SERPL-MCNC: 7.3 GM/DL (ref 6.4–8.2)
RBC # BLD AUTO: 3.01 10*6/UL (ref 4.1–5.1)
SODIUM SERPL-SCNC: 136 MMOL/L (ref 136–145)
WBC NRBC COR # BLD AUTO: 6.1 10*3/UL (ref 4.8–10.8)

## 2021-02-11 VITALS — DIASTOLIC BLOOD PRESSURE: 45 MMHG

## 2021-02-11 VITALS — DIASTOLIC BLOOD PRESSURE: 52 MMHG

## 2021-02-11 VITALS — DIASTOLIC BLOOD PRESSURE: 48 MMHG

## 2021-02-11 VITALS — DIASTOLIC BLOOD PRESSURE: 50 MMHG

## 2021-02-11 LAB
APTT PPP: 30.5 SECONDS (ref 20–32.1)
BASOPHILS # BLD AUTO: 0 10*3/UL (ref 0–0.1)
BASOPHILS NFR BLD AUTO: 0.6 % (ref 0–1)
BUN SERPL-MCNC: 41 MG/DL (ref 7–24)
CHLORIDE SERPL-SCNC: 97 MMOL/L (ref 98–107)
CREAT SERPL-MCNC: 6.78 MG/DL (ref 0.55–1.02)
EOSINOPHIL # BLD AUTO: 0.3 10*3/UL (ref 0–0.4)
EOSINOPHIL # BLD AUTO: 5.5 % (ref 1–4)
ERYTHROCYTE [DISTWIDTH] IN BLOOD BY AUTOMATED COUNT: 16.9 % (ref 0–14.5)
HCT VFR BLD AUTO: 30.8 % (ref 37–47)
INR BLD: 1 (ref 2–3.5)
LYMPHOCYTES # BLD AUTO: 0.9 10*3/UL (ref 1.3–4.4)
LYMPHOCYTES NFR BLD AUTO: 18.7 % (ref 27–41)
MCH RBC QN AUTO: 32.1 PG (ref 27–31)
MCHC RBC AUTO-ENTMCNC: 30.2 G/DL (ref 33–37)
MCV RBC AUTO: 106.2 FL (ref 81–99)
MONOCYTES # BLD AUTO: 0.4 10*3/UL (ref 0.1–1)
MONOCYTES NFR BLD MANUAL: 8.1 % (ref 3–9)
NEUT #: 3.1 10*3/UL (ref 2.3–7.9)
NEUT %: 66.7 % (ref 47–73)
NRBC BLD QL AUTO: 0 % (ref 0–0)
PLATELET # BLD AUTO: 354 10*3/UL (ref 130–400)
PMV BLD AUTO: 9.7 FL (ref 9.6–12.3)
POTASSIUM SERPL-SCNC: 5.2 MMOL/L (ref 3.5–5.1)
RBC # BLD AUTO: 2.9 10*6/UL (ref 4.1–5.1)
SODIUM SERPL-SCNC: 136 MMOL/L (ref 136–145)
WBC NRBC COR # BLD AUTO: 4.7 10*3/UL (ref 4.8–10.8)

## 2021-02-12 VITALS — DIASTOLIC BLOOD PRESSURE: 45 MMHG

## 2021-02-12 VITALS — DIASTOLIC BLOOD PRESSURE: 82 MMHG

## 2021-02-12 VITALS — SYSTOLIC BLOOD PRESSURE: 104 MMHG | DIASTOLIC BLOOD PRESSURE: 55 MMHG

## 2021-02-12 VITALS — DIASTOLIC BLOOD PRESSURE: 40 MMHG

## 2021-02-12 VITALS — DIASTOLIC BLOOD PRESSURE: 27 MMHG

## 2021-02-12 VITALS — SYSTOLIC BLOOD PRESSURE: 127 MMHG | DIASTOLIC BLOOD PRESSURE: 35 MMHG

## 2021-02-12 VITALS — DIASTOLIC BLOOD PRESSURE: 54 MMHG

## 2021-02-12 LAB
BASOPHILS # BLD AUTO: 0 10*3/UL (ref 0–0.1)
BASOPHILS NFR BLD AUTO: 0.6 % (ref 0–1)
BUN SERPL-MCNC: 52 MG/DL (ref 7–24)
CHLORIDE SERPL-SCNC: 96 MMOL/L (ref 98–107)
CREAT SERPL-MCNC: 8.91 MG/DL (ref 0.55–1.02)
EOSINOPHIL # BLD AUTO: 0.3 10*3/UL (ref 0–0.4)
EOSINOPHIL # BLD AUTO: 5.4 % (ref 1–4)
ERYTHROCYTE [DISTWIDTH] IN BLOOD BY AUTOMATED COUNT: 16.7 % (ref 0–14.5)
HCT VFR BLD AUTO: 33 % (ref 37–47)
LYMPHOCYTES # BLD AUTO: 1.4 10*3/UL (ref 1.3–4.4)
LYMPHOCYTES NFR BLD AUTO: 26.7 % (ref 27–41)
MCH RBC QN AUTO: 31.8 PG (ref 27–31)
MCHC RBC AUTO-ENTMCNC: 29.7 G/DL (ref 33–37)
MCV RBC AUTO: 107.1 FL (ref 81–99)
MONOCYTES # BLD AUTO: 0.4 10*3/UL (ref 0.1–1)
MONOCYTES NFR BLD MANUAL: 8.5 % (ref 3–9)
NEUT #: 3 10*3/UL (ref 2.3–7.9)
NEUT %: 58 % (ref 47–73)
NRBC BLD QL AUTO: 0 10*3/UL (ref 0–0)
PLATELET # BLD AUTO: 373 10*3/UL (ref 130–400)
PMV BLD AUTO: 9.5 FL (ref 9.6–12.3)
POTASSIUM SERPL-SCNC: 5.3 MMOL/L (ref 3.5–5.1)
RBC # BLD AUTO: 3.08 10*6/UL (ref 4.1–5.1)
SODIUM SERPL-SCNC: 137 MMOL/L (ref 136–145)
WBC NRBC COR # BLD AUTO: 5.2 10*3/UL (ref 4.8–10.8)

## 2021-02-12 PROCEDURE — 0QBR0ZX EXCISION OF LEFT TOE PHALANX, OPEN APPROACH, DIAGNOSTIC: ICD-10-PCS | Performed by: PODIATRIST

## 2021-02-12 PROCEDURE — 0HRNXK3 REPLACEMENT OF LEFT FOOT SKIN WITH NONAUTOLOGOUS TISSUE SUBSTITUTE, FULL THICKNESS, EXTERNAL APPROACH: ICD-10-PCS | Performed by: PODIATRIST

## 2021-02-12 PROCEDURE — 0QBP0ZX EXCISION OF LEFT METATARSAL, OPEN APPROACH, DIAGNOSTIC: ICD-10-PCS | Performed by: PODIATRIST

## 2021-02-12 PROCEDURE — 2W3TX1Z IMMOBILIZATION OF LEFT FOOT USING SPLINT: ICD-10-PCS | Performed by: PODIATRIST

## 2021-02-12 PROCEDURE — 2W1MX6Z COMPRESSION OF LEFT LOWER EXTREMITY USING PRESSURE DRESSING: ICD-10-PCS | Performed by: PODIATRIST

## 2021-02-12 PROCEDURE — 5A1D70Z PERFORMANCE OF URINARY FILTRATION, INTERMITTENT, LESS THAN 6 HOURS PER DAY: ICD-10-PCS | Performed by: PODIATRIST

## 2021-02-13 VITALS — SYSTOLIC BLOOD PRESSURE: 120 MMHG | DIASTOLIC BLOOD PRESSURE: 48 MMHG

## 2021-02-13 VITALS — DIASTOLIC BLOOD PRESSURE: 44 MMHG | SYSTOLIC BLOOD PRESSURE: 92 MMHG

## 2021-02-13 VITALS — DIASTOLIC BLOOD PRESSURE: 32 MMHG

## 2021-02-13 VITALS — DIASTOLIC BLOOD PRESSURE: 41 MMHG | SYSTOLIC BLOOD PRESSURE: 106 MMHG

## 2021-02-13 VITALS — DIASTOLIC BLOOD PRESSURE: 44 MMHG

## 2021-02-13 LAB
BASOPHILS # BLD AUTO: 0 10*3/UL (ref 0–0.1)
BASOPHILS NFR BLD AUTO: 0.7 % (ref 0–1)
BUN SERPL-MCNC: 27 MG/DL (ref 7–24)
CHLORIDE SERPL-SCNC: 96 MMOL/L (ref 98–107)
CREAT SERPL-MCNC: 6.19 MG/DL (ref 0.55–1.02)
EOSINOPHIL # BLD AUTO: 0.3 10*3/UL (ref 0–0.4)
EOSINOPHIL # BLD AUTO: 5.9 % (ref 1–4)
ERYTHROCYTE [DISTWIDTH] IN BLOOD BY AUTOMATED COUNT: 16.3 % (ref 0–14.5)
HCT VFR BLD AUTO: 33.5 % (ref 37–47)
LYMPHOCYTES # BLD AUTO: 1 10*3/UL (ref 1.3–4.4)
LYMPHOCYTES NFR BLD AUTO: 17.2 % (ref 27–41)
MCH RBC QN AUTO: 31.4 PG (ref 27–31)
MCHC RBC AUTO-ENTMCNC: 29.3 G/DL (ref 33–37)
MCV RBC AUTO: 107.4 FL (ref 81–99)
MONOCYTES # BLD AUTO: 0.5 10*3/UL (ref 0.1–1)
MONOCYTES NFR BLD MANUAL: 8.2 % (ref 3–9)
NEUT #: 3.9 10*3/UL (ref 2.3–7.9)
NEUT %: 67.1 % (ref 47–73)
NRBC BLD QL AUTO: 0 10*3/UL (ref 0–0)
PLATELET # BLD AUTO: 386 10*3/UL (ref 130–400)
PMV BLD AUTO: 9.5 FL (ref 9.6–12.3)
POTASSIUM SERPL-SCNC: 5.2 MMOL/L (ref 3.5–5.1)
RBC # BLD AUTO: 3.12 10*6/UL (ref 4.1–5.1)
SODIUM SERPL-SCNC: 136 MMOL/L (ref 136–145)
WBC NRBC COR # BLD AUTO: 5.8 10*3/UL (ref 4.8–10.8)

## 2021-02-14 VITALS — DIASTOLIC BLOOD PRESSURE: 65 MMHG | SYSTOLIC BLOOD PRESSURE: 148 MMHG

## 2021-02-14 VITALS — DIASTOLIC BLOOD PRESSURE: 34 MMHG

## 2021-02-14 VITALS — SYSTOLIC BLOOD PRESSURE: 132 MMHG | DIASTOLIC BLOOD PRESSURE: 43 MMHG

## 2021-02-14 VITALS — DIASTOLIC BLOOD PRESSURE: 46 MMHG

## 2021-02-14 VITALS — DIASTOLIC BLOOD PRESSURE: 45 MMHG

## 2021-02-14 VITALS — SYSTOLIC BLOOD PRESSURE: 90 MMHG | DIASTOLIC BLOOD PRESSURE: 58 MMHG

## 2021-02-14 LAB
BASOPHILS # BLD AUTO: 0 10*3/UL (ref 0–0.1)
BASOPHILS NFR BLD AUTO: 0.3 % (ref 0–1)
BUN SERPL-MCNC: 39 MG/DL (ref 7–24)
CHLORIDE SERPL-SCNC: 95 MMOL/L (ref 98–107)
CREAT SERPL-MCNC: 8.06 MG/DL (ref 0.55–1.02)
EOSINOPHIL # BLD AUTO: 0.3 10*3/UL (ref 0–0.4)
EOSINOPHIL # BLD AUTO: 5 % (ref 1–4)
ERYTHROCYTE [DISTWIDTH] IN BLOOD BY AUTOMATED COUNT: 16.2 % (ref 0–14.5)
HCT VFR BLD AUTO: 32.9 % (ref 37–47)
LYMPHOCYTES # BLD AUTO: 1.3 10*3/UL (ref 1.3–4.4)
LYMPHOCYTES NFR BLD AUTO: 21.8 % (ref 27–41)
MCH RBC QN AUTO: 31.6 PG (ref 27–31)
MCHC RBC AUTO-ENTMCNC: 29.8 G/DL (ref 33–37)
MCV RBC AUTO: 106.1 FL (ref 81–99)
MONOCYTES # BLD AUTO: 0.4 10*3/UL (ref 0.1–1)
MONOCYTES NFR BLD MANUAL: 7.1 % (ref 3–9)
NEUT #: 3.8 10*3/UL (ref 2.3–7.9)
NEUT %: 65.1 % (ref 47–73)
NRBC BLD QL AUTO: 0 10*3/UL (ref 0–0)
PLATELET # BLD AUTO: 357 10*3/UL (ref 130–400)
PMV BLD AUTO: 9.3 FL (ref 9.6–12.3)
POTASSIUM SERPL-SCNC: 4.9 MMOL/L (ref 3.5–5.1)
RBC # BLD AUTO: 3.1 10*6/UL (ref 4.1–5.1)
SODIUM SERPL-SCNC: 134 MMOL/L (ref 136–145)
SPECIMEN PREPARATION: (no result)
WBC NRBC COR # BLD AUTO: 5.8 10*3/UL (ref 4.8–10.8)

## 2021-02-15 VITALS — SYSTOLIC BLOOD PRESSURE: 113 MMHG | DIASTOLIC BLOOD PRESSURE: 43 MMHG

## 2021-02-15 VITALS — DIASTOLIC BLOOD PRESSURE: 62 MMHG

## 2021-02-15 VITALS — SYSTOLIC BLOOD PRESSURE: 110 MMHG | DIASTOLIC BLOOD PRESSURE: 48 MMHG

## 2021-02-15 VITALS — DIASTOLIC BLOOD PRESSURE: 60 MMHG

## 2021-02-15 LAB
BASOPHILS # BLD AUTO: 0.1 10*3/UL (ref 0–0.1)
BASOPHILS NFR BLD AUTO: 0.8 % (ref 0–1)
BUN SERPL-MCNC: 52 MG/DL (ref 7–24)
CHLORIDE SERPL-SCNC: 97 MMOL/L (ref 98–107)
CREAT SERPL-MCNC: 10.2 MG/DL (ref 0.55–1.02)
EOSINOPHIL # BLD AUTO: 0.3 10*3/UL (ref 0–0.4)
EOSINOPHIL # BLD AUTO: 5.1 % (ref 1–4)
ERYTHROCYTE [DISTWIDTH] IN BLOOD BY AUTOMATED COUNT: 15.9 % (ref 0–14.5)
HCT VFR BLD AUTO: 31.7 % (ref 37–47)
LYMPHOCYTES # BLD AUTO: 0.9 10*3/UL (ref 1.3–4.4)
LYMPHOCYTES NFR BLD AUTO: 14.2 % (ref 27–41)
MCH RBC QN AUTO: 31.9 PG (ref 27–31)
MCHC RBC AUTO-ENTMCNC: 30.3 G/DL (ref 33–37)
MCV RBC AUTO: 105.3 FL (ref 81–99)
MONOCYTES # BLD AUTO: 0.3 10*3/UL (ref 0.1–1)
MONOCYTES NFR BLD MANUAL: 5.5 % (ref 3–9)
NEUT #: 4.5 10*3/UL (ref 2.3–7.9)
NEUT %: 73.9 % (ref 47–73)
NRBC BLD QL AUTO: 0 % (ref 0–0)
PLATELET # BLD AUTO: 316 10*3/UL (ref 130–400)
PMV BLD AUTO: 9.7 FL (ref 9.6–12.3)
POTASSIUM SERPL-SCNC: 5.4 MMOL/L (ref 3.5–5.1)
RBC # BLD AUTO: 3.01 10*6/UL (ref 4.1–5.1)
SODIUM SERPL-SCNC: 136 MMOL/L (ref 136–145)
WBC NRBC COR # BLD AUTO: 6.1 10*3/UL (ref 4.8–10.8)

## 2021-02-15 PROCEDURE — 5A1D70Z PERFORMANCE OF URINARY FILTRATION, INTERMITTENT, LESS THAN 6 HOURS PER DAY: ICD-10-PCS | Performed by: PODIATRIST

## 2021-02-16 VITALS — SYSTOLIC BLOOD PRESSURE: 95 MMHG | DIASTOLIC BLOOD PRESSURE: 43 MMHG

## 2021-02-16 VITALS — DIASTOLIC BLOOD PRESSURE: 40 MMHG

## 2021-02-16 VITALS — SYSTOLIC BLOOD PRESSURE: 105 MMHG | DIASTOLIC BLOOD PRESSURE: 37 MMHG

## 2021-02-16 VITALS — DIASTOLIC BLOOD PRESSURE: 46 MMHG | SYSTOLIC BLOOD PRESSURE: 113 MMHG

## 2021-02-16 VITALS — DIASTOLIC BLOOD PRESSURE: 26 MMHG

## 2021-02-16 LAB
BASOPHILS # BLD AUTO: 0 10*3/UL (ref 0–0.1)
BASOPHILS NFR BLD AUTO: 0.7 % (ref 0–1)
BUN SERPL-MCNC: 27 MG/DL (ref 7–24)
CHLORIDE SERPL-SCNC: 102 MMOL/L (ref 98–107)
CREAT SERPL-MCNC: 6.06 MG/DL (ref 0.55–1.02)
EOSINOPHIL # BLD AUTO: 0.3 10*3/UL (ref 0–0.4)
EOSINOPHIL # BLD AUTO: 5.3 % (ref 1–4)
ERYTHROCYTE [DISTWIDTH] IN BLOOD BY AUTOMATED COUNT: 16 % (ref 0–14.5)
HCT VFR BLD AUTO: 33 % (ref 37–47)
LYMPHOCYTES # BLD AUTO: 1.2 10*3/UL (ref 1.3–4.4)
LYMPHOCYTES NFR BLD AUTO: 20.7 % (ref 27–41)
MCH RBC QN AUTO: 31.8 PG (ref 27–31)
MCHC RBC AUTO-ENTMCNC: 30 G/DL (ref 33–37)
MCV RBC AUTO: 106.1 FL (ref 81–99)
MONOCYTES # BLD AUTO: 0.4 10*3/UL (ref 0.1–1)
MONOCYTES NFR BLD MANUAL: 7.6 % (ref 3–9)
NEUT #: 3.7 10*3/UL (ref 2.3–7.9)
NEUT %: 65.2 % (ref 47–73)
NRBC BLD QL AUTO: 0 10*3/UL (ref 0–0)
PLATELET # BLD AUTO: 310 10*3/UL (ref 130–400)
PMV BLD AUTO: 9.6 FL (ref 9.6–12.3)
POTASSIUM SERPL-SCNC: 4.6 MMOL/L (ref 3.5–5.1)
RBC # BLD AUTO: 3.11 10*6/UL (ref 4.1–5.1)
SODIUM SERPL-SCNC: 141 MMOL/L (ref 136–145)
WBC NRBC COR # BLD AUTO: 5.7 10*3/UL (ref 4.8–10.8)

## 2021-02-17 VITALS — DIASTOLIC BLOOD PRESSURE: 47 MMHG | SYSTOLIC BLOOD PRESSURE: 113 MMHG

## 2021-02-17 VITALS — SYSTOLIC BLOOD PRESSURE: 116 MMHG | DIASTOLIC BLOOD PRESSURE: 31 MMHG

## 2021-02-17 PROCEDURE — 5A1D70Z PERFORMANCE OF URINARY FILTRATION, INTERMITTENT, LESS THAN 6 HOURS PER DAY: ICD-10-PCS | Performed by: PODIATRIST

## 2021-04-30 ENCOUNTER — HOSPITAL ENCOUNTER (OUTPATIENT)
Dept: HOSPITAL 83 - COVID19 | Age: 66
Discharge: HOME | End: 2021-04-30
Attending: STUDENT IN AN ORGANIZED HEALTH CARE EDUCATION/TRAINING PROGRAM
Payer: MEDICARE

## 2021-04-30 DIAGNOSIS — Z01.812: Primary | ICD-10-CM

## 2021-04-30 DIAGNOSIS — Z20.822: ICD-10-CM

## 2021-05-05 ENCOUNTER — HOSPITAL ENCOUNTER (OUTPATIENT)
Dept: HOSPITAL 83 - SDC | Age: 66
Discharge: HOME | End: 2021-05-05
Attending: PODIATRIST
Payer: MEDICARE

## 2021-05-05 VITALS — SYSTOLIC BLOOD PRESSURE: 91 MMHG | DIASTOLIC BLOOD PRESSURE: 40 MMHG

## 2021-05-05 VITALS — DIASTOLIC BLOOD PRESSURE: 30 MMHG | SYSTOLIC BLOOD PRESSURE: 86 MMHG

## 2021-05-05 VITALS — WEIGHT: 215 LBS | BODY MASS INDEX: 38.09 KG/M2 | HEIGHT: 63 IN

## 2021-05-05 VITALS — SYSTOLIC BLOOD PRESSURE: 94 MMHG | DIASTOLIC BLOOD PRESSURE: 32 MMHG

## 2021-05-05 VITALS — DIASTOLIC BLOOD PRESSURE: 51 MMHG

## 2021-05-05 DIAGNOSIS — N18.9: ICD-10-CM

## 2021-05-05 DIAGNOSIS — Z79.899: ICD-10-CM

## 2021-05-05 DIAGNOSIS — Z87.891: ICD-10-CM

## 2021-05-05 DIAGNOSIS — I48.91: ICD-10-CM
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
## 2021-05-05 DIAGNOSIS — S91.302A: Primary | ICD-10-CM
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
## 2021-05-05 DIAGNOSIS — I13.0: ICD-10-CM
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
## 2021-05-05 DIAGNOSIS — I50.9: ICD-10-CM

## 2021-05-05 DIAGNOSIS — I25.2: ICD-10-CM

## 2021-05-05 DIAGNOSIS — K21.9: ICD-10-CM

## 2021-05-05 DIAGNOSIS — I25.10: ICD-10-CM

## 2021-05-06 LAB — SPECIMEN PREPARATION: (no result)

## 2021-07-27 ENCOUNTER — HOSPITAL ENCOUNTER (OUTPATIENT)
Dept: HOSPITAL 83 - MAMMO | Age: 66
Discharge: HOME | End: 2021-07-27
Attending: NURSE PRACTITIONER
Payer: MEDICARE

## 2021-07-27 DIAGNOSIS — G62.9: ICD-10-CM

## 2021-07-27 DIAGNOSIS — N64.89: ICD-10-CM

## 2021-07-27 DIAGNOSIS — N18.5: ICD-10-CM

## 2021-07-27 DIAGNOSIS — I12.9: ICD-10-CM

## 2021-07-27 DIAGNOSIS — I45.10: ICD-10-CM

## 2021-07-27 DIAGNOSIS — D63.1: ICD-10-CM

## 2021-07-27 DIAGNOSIS — Z12.31: Primary | ICD-10-CM

## 2021-07-27 DIAGNOSIS — E78.5: ICD-10-CM

## 2021-12-22 ENCOUNTER — HOSPITAL ENCOUNTER (OUTPATIENT)
Dept: HOSPITAL 83 - MRI | Age: 66
Discharge: HOME | End: 2021-12-22
Payer: MEDICARE

## 2021-12-22 DIAGNOSIS — L97.519: Primary | ICD-10-CM

## 2021-12-22 DIAGNOSIS — M86.171: ICD-10-CM

## 2022-04-28 ENCOUNTER — HOSPITAL ENCOUNTER (OUTPATIENT)
Dept: HOSPITAL 83 - MAMMO | Age: 67
Discharge: HOME | End: 2022-04-28
Attending: NURSE PRACTITIONER
Payer: MEDICARE

## 2022-04-28 DIAGNOSIS — I12.0: ICD-10-CM

## 2022-04-28 DIAGNOSIS — N64.4: Primary | ICD-10-CM

## 2022-04-28 DIAGNOSIS — M51.34: ICD-10-CM

## 2022-04-28 DIAGNOSIS — M47.816: ICD-10-CM

## 2022-04-28 DIAGNOSIS — N18.5: ICD-10-CM

## 2022-04-28 DIAGNOSIS — M47.817: ICD-10-CM

## 2022-04-28 DIAGNOSIS — M51.36: ICD-10-CM

## 2022-05-04 ENCOUNTER — HOSPITAL ENCOUNTER (INPATIENT)
Dept: HOSPITAL 83 - ED | Age: 67
LOS: 1 days | Discharge: HOME | DRG: 189 | End: 2022-05-05
Attending: STUDENT IN AN ORGANIZED HEALTH CARE EDUCATION/TRAINING PROGRAM | Admitting: STUDENT IN AN ORGANIZED HEALTH CARE EDUCATION/TRAINING PROGRAM
Payer: MEDICARE

## 2022-05-04 VITALS — SYSTOLIC BLOOD PRESSURE: 110 MMHG | DIASTOLIC BLOOD PRESSURE: 70 MMHG

## 2022-05-04 VITALS — DIASTOLIC BLOOD PRESSURE: 88 MMHG | SYSTOLIC BLOOD PRESSURE: 132 MMHG

## 2022-05-04 VITALS — SYSTOLIC BLOOD PRESSURE: 132 MMHG | DIASTOLIC BLOOD PRESSURE: 66 MMHG

## 2022-05-04 VITALS — DIASTOLIC BLOOD PRESSURE: 78 MMHG

## 2022-05-04 VITALS — DIASTOLIC BLOOD PRESSURE: 51 MMHG | SYSTOLIC BLOOD PRESSURE: 119 MMHG

## 2022-05-04 VITALS — BODY MASS INDEX: 36.1 KG/M2 | WEIGHT: 211.44 LBS | HEIGHT: 63.98 IN

## 2022-05-04 VITALS — DIASTOLIC BLOOD PRESSURE: 42 MMHG

## 2022-05-04 DIAGNOSIS — R73.9: ICD-10-CM
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
## 2022-05-04 DIAGNOSIS — S91.309A: ICD-10-CM

## 2022-05-04 DIAGNOSIS — I73.9: ICD-10-CM

## 2022-05-04 DIAGNOSIS — I11.0: ICD-10-CM

## 2022-05-04 DIAGNOSIS — Z99.2: ICD-10-CM
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
## 2022-05-04 DIAGNOSIS — Z79.82: ICD-10-CM

## 2022-05-04 DIAGNOSIS — G25.81: ICD-10-CM

## 2022-05-04 DIAGNOSIS — I25.10: ICD-10-CM

## 2022-05-04 DIAGNOSIS — Z79.899: ICD-10-CM
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
## 2022-05-04 DIAGNOSIS — J44.1: ICD-10-CM

## 2022-05-04 DIAGNOSIS — N18.6: ICD-10-CM
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
## 2022-05-04 DIAGNOSIS — E87.2: ICD-10-CM

## 2022-05-04 DIAGNOSIS — Z66: ICD-10-CM

## 2022-05-04 DIAGNOSIS — I48.91: ICD-10-CM

## 2022-05-04 DIAGNOSIS — E83.41: ICD-10-CM

## 2022-05-04 DIAGNOSIS — E87.8: ICD-10-CM

## 2022-05-04 DIAGNOSIS — J96.01: Primary | ICD-10-CM

## 2022-05-04 DIAGNOSIS — E78.5: ICD-10-CM

## 2022-05-04 DIAGNOSIS — Z91.040: ICD-10-CM

## 2022-05-04 DIAGNOSIS — I50.32: ICD-10-CM

## 2022-05-04 DIAGNOSIS — K21.9: ICD-10-CM

## 2022-05-04 LAB
ALP SERPL-CCNC: 63 U/L (ref 45–117)
ALT SERPL W P-5'-P-CCNC: 18 U/L (ref 12–78)
APTT PPP: 28.3 SECONDS (ref 20–32.1)
AST SERPL-CCNC: 22 IU/L (ref 3–35)
BASOPHILS # BLD AUTO: 0 10*3/UL (ref 0–0.1)
BASOPHILS NFR BLD AUTO: 0.7 % (ref 0–1)
BUN SERPL-MCNC: 46 MG/DL (ref 7–24)
CHLORIDE SERPL-SCNC: 92 MMOL/L (ref 98–107)
CREAT SERPL-MCNC: 10 MG/DL (ref 0.55–1.02)
EOSINOPHIL # BLD AUTO: 0.2 10*3/UL (ref 0–0.4)
EOSINOPHIL # BLD AUTO: 2.7 % (ref 1–4)
ERYTHROCYTE [DISTWIDTH] IN BLOOD BY AUTOMATED COUNT: 14.3 % (ref 0–14.5)
HCT VFR BLD AUTO: 39 % (ref 37–47)
INR BLD: 1 (ref 2–3.5)
LIPASE SERPL-CCNC: 256 U/L (ref 73–393)
LYMPHOCYTES # BLD AUTO: 0.8 10*3/UL (ref 1.3–4.4)
LYMPHOCYTES NFR BLD AUTO: 12.8 % (ref 27–41)
MCH RBC QN AUTO: 34.2 PG (ref 27–31)
MCHC RBC AUTO-ENTMCNC: 31.8 G/DL (ref 33–37)
MCV RBC AUTO: 107.4 FL (ref 81–99)
MONOCYTES # BLD AUTO: 0.4 10*3/UL (ref 0.1–1)
MONOCYTES NFR BLD MANUAL: 6.7 % (ref 3–9)
NEUT #: 4.5 10*3/UL (ref 2.3–7.9)
NEUT %: 76.8 % (ref 47–73)
NRBC BLD QL AUTO: 0 10*3/UL (ref 0–0)
PLATELET # BLD AUTO: 235 10*3/UL (ref 130–400)
PMV BLD AUTO: 9.8 FL (ref 9.6–12.3)
POTASSIUM SERPL-SCNC: 5 MMOL/L (ref 3.5–5.1)
PROT SERPL-MCNC: 8 GM/DL (ref 6.4–8.2)
RBC # BLD AUTO: 3.63 10*6/UL (ref 4.1–5.1)
SODIUM SERPL-SCNC: 136 MMOL/L (ref 136–145)
WBC NRBC COR # BLD AUTO: 5.9 10*3/UL (ref 4.8–10.8)

## 2022-05-04 PROCEDURE — 5A1D70Z PERFORMANCE OF URINARY FILTRATION, INTERMITTENT, LESS THAN 6 HOURS PER DAY: ICD-10-PCS | Performed by: STUDENT IN AN ORGANIZED HEALTH CARE EDUCATION/TRAINING PROGRAM

## 2022-05-05 VITALS — SYSTOLIC BLOOD PRESSURE: 153 MMHG | DIASTOLIC BLOOD PRESSURE: 79 MMHG

## 2022-05-05 VITALS — DIASTOLIC BLOOD PRESSURE: 42 MMHG | SYSTOLIC BLOOD PRESSURE: 98 MMHG

## 2022-05-05 VITALS — DIASTOLIC BLOOD PRESSURE: 58 MMHG | SYSTOLIC BLOOD PRESSURE: 111 MMHG

## 2022-05-05 LAB
25(OH)D3 SERPL-MCNC: 21 NG/ML (ref 30–100)
BUN SERPL-MCNC: 41 MG/DL (ref 7–24)
CHLORIDE SERPL-SCNC: 95 MMOL/L (ref 98–107)
CREAT SERPL-MCNC: 8.44 MG/DL (ref 0.55–1.02)
ERYTHROCYTE [DISTWIDTH] IN BLOOD BY AUTOMATED COUNT: 14 % (ref 0–14.5)
HCT VFR BLD AUTO: 39.7 % (ref 37–47)
MACROCYTES BLD QL SMEAR: SLIGHT
MANUAL DIFFERENTIAL PERFORMED BLD QL: YES
MCH RBC QN AUTO: 34.5 PG (ref 27–31)
MCHC RBC AUTO-ENTMCNC: 32.2 G/DL (ref 33–37)
MCV RBC AUTO: 107 FL (ref 81–99)
NRBC BLD QL AUTO: 0 % (ref 0–0)
PLATELET # BLD AUTO: 237 10*3/UL (ref 130–400)
PLATELET SUFFICIENCY: NORMAL
PMV BLD AUTO: 10.3 FL (ref 9.6–12.3)
POLYCHROMASIA BLD QL SMEAR: SLIGHT
POTASSIUM SERPL-SCNC: 5.2 MMOL/L (ref 3.5–5.1)
RBC # BLD AUTO: 3.71 10*6/UL (ref 4.1–5.1)
SODIUM SERPL-SCNC: 135 MMOL/L (ref 136–145)
TOTAL CELLS COUNTED: 100 #CELLS
VITAMIN B12: 395 PG/ML (ref 247–911)
WBC NRBC COR # BLD AUTO: 9.3 10*3/UL (ref 4.8–10.8)

## 2022-05-14 ENCOUNTER — HOSPITAL ENCOUNTER (INPATIENT)
Dept: HOSPITAL 83 - ED | Age: 67
LOS: 3 days | Discharge: HOME | DRG: 871 | End: 2022-05-17
Attending: INTERNAL MEDICINE | Admitting: INTERNAL MEDICINE
Payer: MEDICARE

## 2022-05-14 VITALS — SYSTOLIC BLOOD PRESSURE: 85 MMHG | DIASTOLIC BLOOD PRESSURE: 28 MMHG

## 2022-05-14 VITALS — DIASTOLIC BLOOD PRESSURE: 30 MMHG | SYSTOLIC BLOOD PRESSURE: 92 MMHG

## 2022-05-14 VITALS — SYSTOLIC BLOOD PRESSURE: 98 MMHG | DIASTOLIC BLOOD PRESSURE: 24 MMHG

## 2022-05-14 VITALS — HEIGHT: 62.99 IN | BODY MASS INDEX: 37.39 KG/M2 | WEIGHT: 211 LBS

## 2022-05-14 VITALS — DIASTOLIC BLOOD PRESSURE: 52 MMHG

## 2022-05-14 VITALS — DIASTOLIC BLOOD PRESSURE: 29 MMHG

## 2022-05-14 VITALS — DIASTOLIC BLOOD PRESSURE: 31 MMHG | SYSTOLIC BLOOD PRESSURE: 96 MMHG

## 2022-05-14 VITALS — DIASTOLIC BLOOD PRESSURE: 19 MMHG

## 2022-05-14 DIAGNOSIS — J44.9: ICD-10-CM

## 2022-05-14 DIAGNOSIS — E11.51: ICD-10-CM

## 2022-05-14 DIAGNOSIS — E87.5: ICD-10-CM

## 2022-05-14 DIAGNOSIS — Z66: ICD-10-CM

## 2022-05-14 DIAGNOSIS — L03.116: ICD-10-CM

## 2022-05-14 DIAGNOSIS — K21.9: ICD-10-CM

## 2022-05-14 DIAGNOSIS — E87.1: ICD-10-CM

## 2022-05-14 DIAGNOSIS — E11.65: ICD-10-CM

## 2022-05-14 DIAGNOSIS — I50.32: ICD-10-CM

## 2022-05-14 DIAGNOSIS — I25.10: ICD-10-CM

## 2022-05-14 DIAGNOSIS — R65.20: ICD-10-CM

## 2022-05-14 DIAGNOSIS — Z99.2: ICD-10-CM

## 2022-05-14 DIAGNOSIS — Z91.040: ICD-10-CM

## 2022-05-14 DIAGNOSIS — E87.2: ICD-10-CM

## 2022-05-14 DIAGNOSIS — Z88.8: ICD-10-CM

## 2022-05-14 DIAGNOSIS — Z79.82: ICD-10-CM

## 2022-05-14 DIAGNOSIS — Z79.899: ICD-10-CM

## 2022-05-14 DIAGNOSIS — G25.81: ICD-10-CM

## 2022-05-14 DIAGNOSIS — N18.6: ICD-10-CM

## 2022-05-14 DIAGNOSIS — A41.9: Primary | ICD-10-CM

## 2022-05-14 DIAGNOSIS — I13.2: ICD-10-CM

## 2022-05-14 DIAGNOSIS — D53.9: ICD-10-CM

## 2022-05-14 DIAGNOSIS — E87.8: ICD-10-CM

## 2022-05-14 LAB
ALP SERPL-CCNC: 55 U/L (ref 45–117)
ALT SERPL W P-5'-P-CCNC: 19 U/L (ref 12–78)
AST SERPL-CCNC: 10 IU/L (ref 3–35)
BASOPHILS # BLD AUTO: 0 10*3/UL (ref 0–0.1)
BASOPHILS NFR BLD AUTO: 0.1 % (ref 0–1)
BUN SERPL-MCNC: 53 MG/DL (ref 7–24)
CHLORIDE SERPL-SCNC: 95 MMOL/L (ref 98–107)
CREAT SERPL-MCNC: 8.69 MG/DL (ref 0.55–1.02)
EOSINOPHIL # BLD AUTO: 0 10*3/UL (ref 0–0.4)
EOSINOPHIL # BLD AUTO: 0.1 % (ref 1–4)
ERYTHROCYTE [DISTWIDTH] IN BLOOD BY AUTOMATED COUNT: 14.6 % (ref 0–14.5)
HCT VFR BLD AUTO: 36.5 % (ref 37–47)
LYMPHOCYTES # BLD AUTO: 1 10*3/UL (ref 1.3–4.4)
LYMPHOCYTES NFR BLD AUTO: 6.4 % (ref 27–41)
MCH RBC QN AUTO: 33.8 PG (ref 27–31)
MCHC RBC AUTO-ENTMCNC: 31.5 G/DL (ref 33–37)
MCV RBC AUTO: 107.4 FL (ref 81–99)
MONOCYTES # BLD AUTO: 0.7 10*3/UL (ref 0.1–1)
MONOCYTES NFR BLD MANUAL: 4.6 % (ref 3–9)
NEUT #: 13.9 10*3/UL (ref 2.3–7.9)
NEUT %: 88.3 % (ref 47–73)
NRBC BLD QL AUTO: 0 10*3/UL (ref 0–0)
PLATELET # BLD AUTO: 186 10*3/UL (ref 130–400)
PMV BLD AUTO: 10.8 FL (ref 9.6–12.3)
POTASSIUM SERPL-SCNC: 5.4 MMOL/L (ref 3.5–5.1)
PROT SERPL-MCNC: 7.2 GM/DL (ref 6.4–8.2)
RBC # BLD AUTO: 3.4 10*6/UL (ref 4.1–5.1)
SODIUM SERPL-SCNC: 132 MMOL/L (ref 136–145)
WBC NRBC COR # BLD AUTO: 15.7 10*3/UL (ref 4.8–10.8)

## 2022-05-15 VITALS — DIASTOLIC BLOOD PRESSURE: 41 MMHG

## 2022-05-15 VITALS — DIASTOLIC BLOOD PRESSURE: 43 MMHG

## 2022-05-15 VITALS — DIASTOLIC BLOOD PRESSURE: 26 MMHG

## 2022-05-15 VITALS — DIASTOLIC BLOOD PRESSURE: 45 MMHG | SYSTOLIC BLOOD PRESSURE: 110 MMHG

## 2022-05-15 VITALS — DIASTOLIC BLOOD PRESSURE: 38 MMHG

## 2022-05-15 VITALS — DIASTOLIC BLOOD PRESSURE: 31 MMHG | SYSTOLIC BLOOD PRESSURE: 82 MMHG

## 2022-05-15 VITALS — DIASTOLIC BLOOD PRESSURE: 33 MMHG

## 2022-05-15 VITALS — DIASTOLIC BLOOD PRESSURE: 49 MMHG

## 2022-05-15 LAB
ALP SERPL-CCNC: 49 U/L (ref 45–117)
ALT SERPL W P-5'-P-CCNC: 14 U/L (ref 12–78)
AST SERPL-CCNC: 12 IU/L (ref 3–35)
BUN SERPL-MCNC: 60 MG/DL (ref 7–24)
CHLORIDE SERPL-SCNC: 96 MMOL/L (ref 98–107)
CREAT SERPL-MCNC: 9.21 MG/DL (ref 0.55–1.02)
ERYTHROCYTE [DISTWIDTH] IN BLOOD BY AUTOMATED COUNT: 14.6 % (ref 0–14.5)
HCT VFR BLD AUTO: 34.1 % (ref 37–47)
MACROCYTES BLD QL SMEAR: (no result)
MANUAL DIFFERENTIAL PERFORMED BLD QL: YES
MCH RBC QN AUTO: 34.2 PG (ref 27–31)
MCHC RBC AUTO-ENTMCNC: 31.1 G/DL (ref 33–37)
MCV RBC AUTO: 110 FL (ref 81–99)
NRBC BLD QL AUTO: 0 10*3/UL (ref 0–0)
PLATELET # BLD AUTO: 133 10*3/UL (ref 130–400)
PLATELET SUFFICIENCY: NORMAL
PMV BLD AUTO: 11.3 FL (ref 9.6–12.3)
POTASSIUM SERPL-SCNC: 5.1 MMOL/L (ref 3.5–5.1)
PROT SERPL-MCNC: 6.6 GM/DL (ref 6.4–8.2)
RBC # BLD AUTO: 3.1 10*6/UL (ref 4.1–5.1)
SODIUM SERPL-SCNC: 132 MMOL/L (ref 136–145)
T4 FREE SERPL-MCNC: 0.88 NG/DL (ref 0.76–1.46)
TOTAL CELLS COUNTED: 100 #CELLS
TSH SERPL DL<=0.005 MIU/L-ACNC: 0.53 UIU/ML (ref 0.36–4.75)
WBC NRBC COR # BLD AUTO: 12.4 10*3/UL (ref 4.8–10.8)

## 2022-05-15 PROCEDURE — 5A1D70Z PERFORMANCE OF URINARY FILTRATION, INTERMITTENT, LESS THAN 6 HOURS PER DAY: ICD-10-PCS | Performed by: INTERNAL MEDICINE

## 2022-05-16 VITALS — DIASTOLIC BLOOD PRESSURE: 26 MMHG

## 2022-05-16 VITALS — DIASTOLIC BLOOD PRESSURE: 44 MMHG

## 2022-05-16 VITALS — SYSTOLIC BLOOD PRESSURE: 152 MMHG | DIASTOLIC BLOOD PRESSURE: 67 MMHG

## 2022-05-16 VITALS — SYSTOLIC BLOOD PRESSURE: 119 MMHG | DIASTOLIC BLOOD PRESSURE: 42 MMHG

## 2022-05-16 LAB
BASOPHILS # BLD AUTO: 0 10*3/UL (ref 0–0.1)
BASOPHILS NFR BLD AUTO: 0.3 % (ref 0–1)
BUN SERPL-MCNC: 78 MG/DL (ref 7–24)
CHLORIDE SERPL-SCNC: 95 MMOL/L (ref 98–107)
CREAT SERPL-MCNC: 11.1 MG/DL (ref 0.55–1.02)
EOSINOPHIL # BLD AUTO: 0.2 10*3/UL (ref 0–0.4)
EOSINOPHIL # BLD AUTO: 2.1 % (ref 1–4)
ERYTHROCYTE [DISTWIDTH] IN BLOOD BY AUTOMATED COUNT: 14.3 % (ref 0–14.5)
HCT VFR BLD AUTO: 33.1 % (ref 37–47)
LYMPHOCYTES # BLD AUTO: 0.8 10*3/UL (ref 1.3–4.4)
LYMPHOCYTES NFR BLD AUTO: 8.8 % (ref 27–41)
MCH RBC QN AUTO: 34.4 PG (ref 27–31)
MCHC RBC AUTO-ENTMCNC: 32 G/DL (ref 33–37)
MCV RBC AUTO: 107.5 FL (ref 81–99)
MONOCYTES # BLD AUTO: 0.4 10*3/UL (ref 0.1–1)
MONOCYTES NFR BLD MANUAL: 5 % (ref 3–9)
NEUT #: 7.2 10*3/UL (ref 2.3–7.9)
NEUT %: 83.3 % (ref 47–73)
NRBC BLD QL AUTO: 0 10*3/UL (ref 0–0)
PLATELET # BLD AUTO: 142 10*3/UL (ref 130–400)
PMV BLD AUTO: 11.2 FL (ref 9.6–12.3)
POTASSIUM SERPL-SCNC: 5.3 MMOL/L (ref 3.5–5.1)
RBC # BLD AUTO: 3.08 10*6/UL (ref 4.1–5.1)
SODIUM SERPL-SCNC: 133 MMOL/L (ref 136–145)
WBC NRBC COR # BLD AUTO: 8.7 10*3/UL (ref 4.8–10.8)

## 2022-05-17 VITALS — SYSTOLIC BLOOD PRESSURE: 111 MMHG | DIASTOLIC BLOOD PRESSURE: 45 MMHG

## 2022-05-17 VITALS — DIASTOLIC BLOOD PRESSURE: 41 MMHG

## 2022-05-17 VITALS — DIASTOLIC BLOOD PRESSURE: 46 MMHG

## 2022-05-17 LAB
BASOPHILS # BLD AUTO: 0 10*3/UL (ref 0–0.1)
BASOPHILS NFR BLD AUTO: 0.1 % (ref 0–1)
BUN SERPL-MCNC: 38 MG/DL (ref 7–24)
CHLORIDE SERPL-SCNC: 99 MMOL/L (ref 98–107)
CREAT SERPL-MCNC: 6.59 MG/DL (ref 0.55–1.02)
EOSINOPHIL # BLD AUTO: 0 10*3/UL (ref 0–0.4)
EOSINOPHIL # BLD AUTO: 0.2 % (ref 1–4)
ERYTHROCYTE [DISTWIDTH] IN BLOOD BY AUTOMATED COUNT: 14.1 % (ref 0–14.5)
HCT VFR BLD AUTO: 34.2 % (ref 37–47)
LYMPHOCYTES # BLD AUTO: 0.7 10*3/UL (ref 1.3–4.4)
LYMPHOCYTES NFR BLD AUTO: 6 % (ref 27–41)
MCH RBC QN AUTO: 33.5 PG (ref 27–31)
MCHC RBC AUTO-ENTMCNC: 31.3 G/DL (ref 33–37)
MCV RBC AUTO: 107.2 FL (ref 81–99)
MONOCYTES # BLD AUTO: 0.4 10*3/UL (ref 0.1–1)
MONOCYTES NFR BLD MANUAL: 3.9 % (ref 3–9)
NEUT #: 9.9 10*3/UL (ref 2.3–7.9)
NEUT %: 89.1 % (ref 47–73)
NRBC BLD QL AUTO: 0 % (ref 0–0)
PLATELET # BLD AUTO: 164 10*3/UL (ref 130–400)
PMV BLD AUTO: 10.9 FL (ref 9.6–12.3)
POTASSIUM SERPL-SCNC: 5.7 MMOL/L (ref 3.5–5.1)
RBC # BLD AUTO: 3.19 10*6/UL (ref 4.1–5.1)
SODIUM SERPL-SCNC: 134 MMOL/L (ref 136–145)
WBC NRBC COR # BLD AUTO: 11.1 10*3/UL (ref 4.8–10.8)

## 2022-07-25 ENCOUNTER — HOSPITAL ENCOUNTER (OUTPATIENT)
Dept: HOSPITAL 83 - WOUNDCARE | Age: 67
Discharge: HOME | End: 2022-07-25
Attending: PODIATRIST
Payer: MEDICARE

## 2022-07-25 DIAGNOSIS — Z95.5: ICD-10-CM

## 2022-07-25 DIAGNOSIS — N18.6: ICD-10-CM

## 2022-07-25 DIAGNOSIS — J44.9: ICD-10-CM

## 2022-07-25 DIAGNOSIS — I73.9: ICD-10-CM

## 2022-07-25 DIAGNOSIS — L97.512: ICD-10-CM

## 2022-07-25 DIAGNOSIS — R60.9: ICD-10-CM
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
## 2022-07-25 DIAGNOSIS — Z87.891: ICD-10-CM

## 2022-07-25 DIAGNOSIS — G25.81: ICD-10-CM

## 2022-07-25 DIAGNOSIS — L84: ICD-10-CM
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
## 2022-07-25 DIAGNOSIS — Z99.2: ICD-10-CM

## 2022-07-25 DIAGNOSIS — K21.9: ICD-10-CM

## 2022-07-25 DIAGNOSIS — M24.571: ICD-10-CM

## 2022-07-25 DIAGNOSIS — I87.2: ICD-10-CM

## 2022-07-25 DIAGNOSIS — E78.5: ICD-10-CM

## 2022-07-25 DIAGNOSIS — I12.0: ICD-10-CM

## 2022-07-25 DIAGNOSIS — L97.521: ICD-10-CM

## 2022-07-25 DIAGNOSIS — Z95.1: ICD-10-CM

## 2022-07-25 DIAGNOSIS — T81.89XA: Primary | ICD-10-CM

## 2022-07-25 DIAGNOSIS — Z89.412: ICD-10-CM

## 2022-07-25 DIAGNOSIS — I25.10: ICD-10-CM

## 2022-08-01 ENCOUNTER — HOSPITAL ENCOUNTER (OUTPATIENT)
Dept: HOSPITAL 83 - WOUNDCARE | Age: 67
Discharge: HOME | End: 2022-08-01
Payer: MEDICARE

## 2022-08-01 DIAGNOSIS — Z99.2: ICD-10-CM

## 2022-08-01 DIAGNOSIS — L97.512: ICD-10-CM

## 2022-08-01 DIAGNOSIS — Z95.5: ICD-10-CM

## 2022-08-01 DIAGNOSIS — Z95.1: ICD-10-CM

## 2022-08-01 DIAGNOSIS — L97.521: ICD-10-CM

## 2022-08-01 DIAGNOSIS — R60.9: ICD-10-CM

## 2022-08-01 DIAGNOSIS — K21.9: ICD-10-CM

## 2022-08-01 DIAGNOSIS — G25.89: ICD-10-CM

## 2022-08-01 DIAGNOSIS — I25.10: ICD-10-CM

## 2022-08-01 DIAGNOSIS — I87.2: ICD-10-CM

## 2022-08-01 DIAGNOSIS — J44.9: ICD-10-CM

## 2022-08-01 DIAGNOSIS — E78.5: ICD-10-CM

## 2022-08-01 DIAGNOSIS — I12.0: ICD-10-CM

## 2022-08-01 DIAGNOSIS — Z89.412: ICD-10-CM

## 2022-08-01 DIAGNOSIS — Z87.891: ICD-10-CM

## 2022-08-01 DIAGNOSIS — L84: ICD-10-CM

## 2022-08-01 DIAGNOSIS — R73.09: ICD-10-CM

## 2022-08-01 DIAGNOSIS — M24.571: ICD-10-CM

## 2022-08-01 DIAGNOSIS — N18.6: ICD-10-CM

## 2022-08-01 DIAGNOSIS — I73.9: ICD-10-CM
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
## 2022-08-01 DIAGNOSIS — T81.89XD: Primary | ICD-10-CM

## 2022-08-01 LAB
ALP SERPL-CCNC: 84 U/L (ref 45–117)
ALT SERPL W P-5'-P-CCNC: 13 U/L (ref 12–78)
AST SERPL-CCNC: 14 IU/L (ref 3–35)
BASOPHILS # BLD AUTO: 0 10*3/UL (ref 0–0.1)
BASOPHILS NFR BLD AUTO: 0.6 % (ref 0–1)
BUN SERPL-MCNC: 21 MG/DL (ref 7–24)
CHLORIDE SERPL-SCNC: 98 MMOL/L (ref 98–107)
CREAT SERPL-MCNC: 4.99 MG/DL (ref 0.55–1.02)
EOSINOPHIL # BLD AUTO: 0.2 10*3/UL (ref 0–0.4)
EOSINOPHIL # BLD AUTO: 4 % (ref 1–4)
ERYTHROCYTE [DISTWIDTH] IN BLOOD BY AUTOMATED COUNT: 14.4 % (ref 0–14.5)
HCT VFR BLD AUTO: 37.2 % (ref 37–47)
LYMPHOCYTES # BLD AUTO: 0.7 10*3/UL (ref 1.3–4.4)
LYMPHOCYTES NFR BLD AUTO: 15.3 % (ref 27–41)
MCH RBC QN AUTO: 33 PG (ref 27–31)
MCHC RBC AUTO-ENTMCNC: 31.2 G/DL (ref 33–37)
MCV RBC AUTO: 105.7 FL (ref 81–99)
MONOCYTES # BLD AUTO: 0.3 10*3/UL (ref 0.1–1)
MONOCYTES NFR BLD MANUAL: 5.5 % (ref 3–9)
NEUT #: 3.5 10*3/UL (ref 2.3–7.9)
NEUT %: 74.4 % (ref 47–73)
NRBC BLD QL AUTO: 0 10*3/UL (ref 0–0)
PLATELET # BLD AUTO: 199 10*3/UL (ref 130–400)
PMV BLD AUTO: 9.5 FL (ref 9.6–12.3)
POTASSIUM SERPL-SCNC: 3.7 MMOL/L (ref 3.5–5.1)
PROT SERPL-MCNC: 7.7 GM/DL (ref 6.4–8.2)
RBC # BLD AUTO: 3.52 10*6/UL (ref 4.1–5.1)
SODIUM SERPL-SCNC: 139 MMOL/L (ref 136–145)
WBC NRBC COR # BLD AUTO: 4.7 10*3/UL (ref 4.8–10.8)

## 2022-08-08 ENCOUNTER — HOSPITAL ENCOUNTER (OUTPATIENT)
Dept: HOSPITAL 83 - WOUNDCARE | Age: 67
Discharge: HOME | End: 2022-08-08
Payer: MEDICARE

## 2022-08-08 DIAGNOSIS — Z89.412: ICD-10-CM

## 2022-08-08 DIAGNOSIS — Z99.2: ICD-10-CM

## 2022-08-08 DIAGNOSIS — Z95.1: ICD-10-CM

## 2022-08-08 DIAGNOSIS — Z87.891: ICD-10-CM

## 2022-08-08 DIAGNOSIS — E78.5: ICD-10-CM

## 2022-08-08 DIAGNOSIS — N18.6: ICD-10-CM

## 2022-08-08 DIAGNOSIS — I87.2: ICD-10-CM

## 2022-08-08 DIAGNOSIS — Z95.5: ICD-10-CM

## 2022-08-08 DIAGNOSIS — J44.9: ICD-10-CM

## 2022-08-08 DIAGNOSIS — L97.521: ICD-10-CM

## 2022-08-08 DIAGNOSIS — M24.571: ICD-10-CM

## 2022-08-08 DIAGNOSIS — I12.0: ICD-10-CM

## 2022-08-08 DIAGNOSIS — L84: ICD-10-CM

## 2022-08-08 DIAGNOSIS — I25.10: ICD-10-CM

## 2022-08-08 DIAGNOSIS — I73.9: ICD-10-CM

## 2022-08-08 DIAGNOSIS — G25.89: ICD-10-CM

## 2022-08-08 DIAGNOSIS — K21.9: ICD-10-CM

## 2022-08-08 DIAGNOSIS — R60.9: ICD-10-CM
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
## 2022-08-08 DIAGNOSIS — T81.89XD: Primary | ICD-10-CM

## 2022-08-08 DIAGNOSIS — L97.512: ICD-10-CM

## 2022-08-15 ENCOUNTER — HOSPITAL ENCOUNTER (OUTPATIENT)
Dept: HOSPITAL 83 - WOUNDCARE | Age: 67
Discharge: HOME | End: 2022-08-15
Payer: MEDICARE

## 2022-08-15 DIAGNOSIS — I87.2: ICD-10-CM

## 2022-08-15 DIAGNOSIS — T81.89XD: Primary | ICD-10-CM
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
## 2022-08-15 DIAGNOSIS — L97.521: ICD-10-CM

## 2022-08-15 DIAGNOSIS — N18.6: ICD-10-CM

## 2022-08-15 DIAGNOSIS — I73.9: ICD-10-CM

## 2022-08-15 DIAGNOSIS — Z95.1: ICD-10-CM

## 2022-08-15 DIAGNOSIS — K21.9: ICD-10-CM

## 2022-08-15 DIAGNOSIS — R60.9: ICD-10-CM

## 2022-08-15 DIAGNOSIS — G25.89: ICD-10-CM

## 2022-08-15 DIAGNOSIS — I12.0: ICD-10-CM

## 2022-08-15 DIAGNOSIS — L84: ICD-10-CM

## 2022-08-15 DIAGNOSIS — Z87.891: ICD-10-CM

## 2022-08-15 DIAGNOSIS — M24.571: ICD-10-CM

## 2022-08-15 DIAGNOSIS — I25.10: ICD-10-CM

## 2022-08-15 DIAGNOSIS — Z89.412: ICD-10-CM

## 2022-08-15 DIAGNOSIS — L97.512: ICD-10-CM

## 2022-08-15 DIAGNOSIS — E78.5: ICD-10-CM

## 2022-08-15 DIAGNOSIS — Z95.4: ICD-10-CM

## 2022-08-15 DIAGNOSIS — J44.9: ICD-10-CM

## 2022-08-15 DIAGNOSIS — Z99.2: ICD-10-CM

## 2022-08-17 ENCOUNTER — HOSPITAL ENCOUNTER (OUTPATIENT)
Dept: HOSPITAL 83 - SDC | Age: 67
Discharge: HOME | End: 2022-08-17
Attending: PODIATRIST
Payer: MEDICARE

## 2022-08-17 VITALS — DIASTOLIC BLOOD PRESSURE: 43 MMHG | SYSTOLIC BLOOD PRESSURE: 117 MMHG

## 2022-08-17 VITALS — DIASTOLIC BLOOD PRESSURE: 37 MMHG | SYSTOLIC BLOOD PRESSURE: 115 MMHG

## 2022-08-17 VITALS — HEIGHT: 62.99 IN | BODY MASS INDEX: 36.32 KG/M2 | WEIGHT: 205 LBS

## 2022-08-17 VITALS — DIASTOLIC BLOOD PRESSURE: 58 MMHG

## 2022-08-17 VITALS — DIASTOLIC BLOOD PRESSURE: 52 MMHG
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
## 2022-08-17 DIAGNOSIS — I25.10: ICD-10-CM

## 2022-08-17 DIAGNOSIS — S91.302A: Primary | ICD-10-CM

## 2022-08-17 DIAGNOSIS — N18.9: ICD-10-CM

## 2022-08-17 DIAGNOSIS — I50.9: ICD-10-CM

## 2022-08-17 DIAGNOSIS — M86.8X7: ICD-10-CM

## 2022-08-17 DIAGNOSIS — I25.2: ICD-10-CM
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
## 2022-08-17 DIAGNOSIS — S91.301A: ICD-10-CM

## 2022-08-17 DIAGNOSIS — I13.0: ICD-10-CM

## 2022-08-17 DIAGNOSIS — K21.9: ICD-10-CM
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
## 2022-08-18 LAB
SPECIMEN PREPARATION: (no result)
SPECIMEN PREPARATION: (no result)

## 2022-08-22 ENCOUNTER — HOSPITAL ENCOUNTER (OUTPATIENT)
Dept: HOSPITAL 83 - LAB | Age: 67
Discharge: HOME | End: 2022-08-22
Payer: MEDICARE

## 2022-08-22 DIAGNOSIS — R60.9: ICD-10-CM

## 2022-08-22 DIAGNOSIS — Z95.1: ICD-10-CM

## 2022-08-22 DIAGNOSIS — Z87.891: ICD-10-CM

## 2022-08-22 DIAGNOSIS — L97.521: ICD-10-CM

## 2022-08-22 DIAGNOSIS — Z99.2: ICD-10-CM

## 2022-08-22 DIAGNOSIS — J44.9: ICD-10-CM

## 2022-08-22 DIAGNOSIS — N18.6: ICD-10-CM

## 2022-08-22 DIAGNOSIS — Z95.5: ICD-10-CM

## 2022-08-22 DIAGNOSIS — Z89.412: ICD-10-CM

## 2022-08-22 DIAGNOSIS — I12.0: ICD-10-CM

## 2022-08-22 DIAGNOSIS — Z95.4: ICD-10-CM

## 2022-08-22 DIAGNOSIS — I25.10: ICD-10-CM

## 2022-08-22 DIAGNOSIS — E78.5: ICD-10-CM

## 2022-08-22 DIAGNOSIS — I87.2: ICD-10-CM

## 2022-08-22 DIAGNOSIS — L97.512: ICD-10-CM

## 2022-08-22 DIAGNOSIS — G25.81: ICD-10-CM

## 2022-08-22 DIAGNOSIS — M24.571: ICD-10-CM
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
## 2022-08-22 DIAGNOSIS — K21.9: ICD-10-CM

## 2022-08-22 DIAGNOSIS — M86.672: ICD-10-CM

## 2022-08-22 DIAGNOSIS — I73.9: ICD-10-CM

## 2022-08-22 DIAGNOSIS — T81.89XD: Primary | ICD-10-CM

## 2022-08-22 LAB
BASOPHILS # BLD AUTO: 0 10*3/UL (ref 0–0.1)
BASOPHILS NFR BLD AUTO: 0.4 % (ref 0–1)
BUN SERPL-MCNC: 13 MG/DL (ref 7–24)
CHLORIDE SERPL-SCNC: 98 MMOL/L (ref 98–107)
CREAT SERPL-MCNC: 4.45 MG/DL (ref 0.55–1.02)
EOSINOPHIL # BLD AUTO: 0.3 10*3/UL (ref 0–0.4)
EOSINOPHIL # BLD AUTO: 6.2 % (ref 1–4)
ERYTHROCYTE [DISTWIDTH] IN BLOOD BY AUTOMATED COUNT: 14.2 % (ref 0–14.5)
HCT VFR BLD AUTO: 36.1 % (ref 37–47)
LYMPHOCYTES # BLD AUTO: 0.7 10*3/UL (ref 1.3–4.4)
LYMPHOCYTES NFR BLD AUTO: 15.1 % (ref 27–41)
MCH RBC QN AUTO: 32.8 PG (ref 27–31)
MCHC RBC AUTO-ENTMCNC: 31.9 G/DL (ref 33–37)
MCV RBC AUTO: 102.8 FL (ref 81–99)
MONOCYTES # BLD AUTO: 0.3 10*3/UL (ref 0.1–1)
MONOCYTES NFR BLD MANUAL: 6.8 % (ref 3–9)
NEUT #: 3.4 10*3/UL (ref 2.3–7.9)
NEUT %: 71.3 % (ref 47–73)
NRBC BLD QL AUTO: 0 10*3/UL (ref 0–0)
PLATELET # BLD AUTO: 209 10*3/UL (ref 130–400)
PMV BLD AUTO: 9.6 FL (ref 9.6–12.3)
POTASSIUM SERPL-SCNC: 3.7 MMOL/L (ref 3.5–5.1)
RBC # BLD AUTO: 3.51 10*6/UL (ref 4.1–5.1)
SODIUM SERPL-SCNC: 138 MMOL/L (ref 136–145)
WBC NRBC COR # BLD AUTO: 4.8 10*3/UL (ref 4.8–10.8)

## 2022-08-29 ENCOUNTER — HOSPITAL ENCOUNTER (OUTPATIENT)
Dept: HOSPITAL 83 - WOUNDCARE | Age: 67
Discharge: HOME | End: 2022-08-29
Payer: MEDICARE

## 2022-08-29 DIAGNOSIS — M24.571: ICD-10-CM

## 2022-08-29 DIAGNOSIS — T81.89XD: ICD-10-CM

## 2022-08-29 DIAGNOSIS — N18.6: ICD-10-CM

## 2022-08-29 DIAGNOSIS — R60.9: ICD-10-CM

## 2022-08-29 DIAGNOSIS — L97.522: Primary | ICD-10-CM

## 2022-08-29 DIAGNOSIS — Z95.1: ICD-10-CM

## 2022-08-29 DIAGNOSIS — L97.521: ICD-10-CM

## 2022-08-29 DIAGNOSIS — L84: ICD-10-CM

## 2022-08-29 DIAGNOSIS — Z89.412: ICD-10-CM

## 2022-08-29 DIAGNOSIS — I25.10: ICD-10-CM

## 2022-08-29 DIAGNOSIS — I87.2: ICD-10-CM
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
## 2022-08-29 DIAGNOSIS — G25.89: ICD-10-CM

## 2022-08-29 DIAGNOSIS — I73.9: ICD-10-CM

## 2022-08-29 DIAGNOSIS — Z99.2: ICD-10-CM

## 2022-08-29 DIAGNOSIS — Z95.4: ICD-10-CM

## 2022-08-29 DIAGNOSIS — E78.5: ICD-10-CM

## 2022-08-29 DIAGNOSIS — K21.9: ICD-10-CM

## 2022-08-29 DIAGNOSIS — J44.9: ICD-10-CM

## 2022-08-29 DIAGNOSIS — I12.0: ICD-10-CM

## 2022-08-29 DIAGNOSIS — Z87.891: ICD-10-CM

## 2022-09-12 ENCOUNTER — HOSPITAL ENCOUNTER (OUTPATIENT)
Dept: HOSPITAL 83 - WOUNDCARE | Age: 67
End: 2022-09-12
Payer: MEDICARE

## 2022-09-12 DIAGNOSIS — L97.514: ICD-10-CM

## 2022-09-12 DIAGNOSIS — Z95.5: ICD-10-CM

## 2022-09-12 DIAGNOSIS — L97.521: ICD-10-CM

## 2022-09-12 DIAGNOSIS — R60.9: ICD-10-CM
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
## 2022-09-12 DIAGNOSIS — L89.893: ICD-10-CM

## 2022-09-12 DIAGNOSIS — I87.2: ICD-10-CM

## 2022-09-12 DIAGNOSIS — L89.613: ICD-10-CM

## 2022-09-12 DIAGNOSIS — T81.89XD: Primary | ICD-10-CM

## 2022-09-12 DIAGNOSIS — Z89.412: ICD-10-CM

## 2022-09-12 DIAGNOSIS — E78.5: ICD-10-CM

## 2022-09-12 DIAGNOSIS — K21.9: ICD-10-CM

## 2022-09-12 DIAGNOSIS — Z95.1: ICD-10-CM

## 2022-09-12 DIAGNOSIS — N18.6: ICD-10-CM

## 2022-09-12 DIAGNOSIS — M24.571: ICD-10-CM

## 2022-09-12 DIAGNOSIS — I73.9: ICD-10-CM

## 2022-09-12 DIAGNOSIS — Z99.2: ICD-10-CM

## 2022-09-12 DIAGNOSIS — I12.0: ICD-10-CM

## 2022-09-12 DIAGNOSIS — Z87.891: ICD-10-CM

## 2022-09-12 DIAGNOSIS — J44.9: ICD-10-CM

## 2022-09-12 DIAGNOSIS — G25.81: ICD-10-CM

## 2022-09-12 DIAGNOSIS — I25.10: ICD-10-CM

## 2022-09-19 ENCOUNTER — HOSPITAL ENCOUNTER (OUTPATIENT)
Dept: HOSPITAL 83 - WOUNDCARE | Age: 67
Discharge: HOME | End: 2022-09-19
Payer: MEDICARE

## 2022-09-19 DIAGNOSIS — I87.2: ICD-10-CM

## 2022-09-19 DIAGNOSIS — Z95.5: ICD-10-CM

## 2022-09-19 DIAGNOSIS — J44.9: ICD-10-CM

## 2022-09-19 DIAGNOSIS — E78.5: ICD-10-CM

## 2022-09-19 DIAGNOSIS — M24.571: ICD-10-CM

## 2022-09-19 DIAGNOSIS — L97.521: ICD-10-CM
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
## 2022-09-19 DIAGNOSIS — L89.513: ICD-10-CM

## 2022-09-19 DIAGNOSIS — L89.523: ICD-10-CM

## 2022-09-19 DIAGNOSIS — G25.81: ICD-10-CM

## 2022-09-19 DIAGNOSIS — I12.0: ICD-10-CM

## 2022-09-19 DIAGNOSIS — Z95.1: ICD-10-CM

## 2022-09-19 DIAGNOSIS — Z89.412: ICD-10-CM

## 2022-09-19 DIAGNOSIS — Z87.891: ICD-10-CM

## 2022-09-19 DIAGNOSIS — L84: ICD-10-CM

## 2022-09-19 DIAGNOSIS — K21.9: ICD-10-CM

## 2022-09-19 DIAGNOSIS — Z99.2: ICD-10-CM

## 2022-09-19 DIAGNOSIS — N18.6: ICD-10-CM

## 2022-09-19 DIAGNOSIS — I25.10: ICD-10-CM

## 2022-09-19 DIAGNOSIS — L89.893: ICD-10-CM

## 2022-09-19 DIAGNOSIS — T81.89XD: Primary | ICD-10-CM

## 2022-09-19 DIAGNOSIS — I73.9: ICD-10-CM

## 2022-09-19 DIAGNOSIS — R60.9: ICD-10-CM

## 2022-09-19 DIAGNOSIS — Z95.4: ICD-10-CM

## 2022-09-19 DIAGNOSIS — L97.512: ICD-10-CM

## 2022-09-21 ENCOUNTER — HOSPITAL ENCOUNTER (OUTPATIENT)
Dept: HOSPITAL 83 - SDC | Age: 67
Discharge: HOME | End: 2022-09-21
Attending: PODIATRIST
Payer: MEDICARE

## 2022-09-21 VITALS — DIASTOLIC BLOOD PRESSURE: 40 MMHG

## 2022-09-21 VITALS — SYSTOLIC BLOOD PRESSURE: 125 MMHG | DIASTOLIC BLOOD PRESSURE: 54 MMHG

## 2022-09-21 VITALS — DIASTOLIC BLOOD PRESSURE: 34 MMHG

## 2022-09-21 VITALS — SYSTOLIC BLOOD PRESSURE: 98 MMHG | DIASTOLIC BLOOD PRESSURE: 42 MMHG

## 2022-09-21 VITALS — HEIGHT: 62.99 IN | BODY MASS INDEX: 36.32 KG/M2 | WEIGHT: 205 LBS

## 2022-09-21 VITALS — DIASTOLIC BLOOD PRESSURE: 55 MMHG

## 2022-09-21 VITALS — DIASTOLIC BLOOD PRESSURE: 41 MMHG

## 2022-09-21 VITALS — DIASTOLIC BLOOD PRESSURE: 38 MMHG

## 2022-09-21 VITALS — DIASTOLIC BLOOD PRESSURE: 44 MMHG

## 2022-09-21 DIAGNOSIS — I25.2: ICD-10-CM

## 2022-09-21 DIAGNOSIS — I50.9: ICD-10-CM

## 2022-09-21 DIAGNOSIS — S91.301A: ICD-10-CM

## 2022-09-21 DIAGNOSIS — N18.9: ICD-10-CM
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
## 2022-09-21 DIAGNOSIS — Z91.048: ICD-10-CM
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
## 2022-09-21 DIAGNOSIS — I25.10: ICD-10-CM
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
## 2022-09-21 DIAGNOSIS — M86.671: Primary | ICD-10-CM

## 2022-09-21 DIAGNOSIS — I12.9: ICD-10-CM

## 2022-09-21 DIAGNOSIS — K21.9: ICD-10-CM

## 2022-09-22 LAB — SPECIMEN PREPARATION: (no result)

## 2022-09-26 ENCOUNTER — HOSPITAL ENCOUNTER (OUTPATIENT)
Dept: HOSPITAL 83 - WOUNDCARE | Age: 67
Discharge: HOME | End: 2022-09-26
Payer: MEDICARE

## 2022-09-26 DIAGNOSIS — G60.9: ICD-10-CM

## 2022-09-26 DIAGNOSIS — R60.9: ICD-10-CM

## 2022-09-26 DIAGNOSIS — Z95.4: ICD-10-CM

## 2022-09-26 DIAGNOSIS — Z95.1: ICD-10-CM

## 2022-09-26 DIAGNOSIS — T86.828: Primary | ICD-10-CM

## 2022-09-26 DIAGNOSIS — L89.613: ICD-10-CM

## 2022-09-26 DIAGNOSIS — L89.893: ICD-10-CM

## 2022-09-26 DIAGNOSIS — L89.623: ICD-10-CM

## 2022-09-26 DIAGNOSIS — I87.2: ICD-10-CM

## 2022-09-26 DIAGNOSIS — I73.9: ICD-10-CM

## 2022-09-26 DIAGNOSIS — E78.5: ICD-10-CM

## 2022-09-26 DIAGNOSIS — N18.6: ICD-10-CM

## 2022-09-26 DIAGNOSIS — L97.514: ICD-10-CM

## 2022-09-26 DIAGNOSIS — Z99.2: ICD-10-CM

## 2022-09-26 DIAGNOSIS — Z89.412: ICD-10-CM

## 2022-09-26 DIAGNOSIS — I12.0: ICD-10-CM

## 2022-09-26 DIAGNOSIS — J44.9: ICD-10-CM

## 2022-09-26 DIAGNOSIS — M24.571: ICD-10-CM

## 2022-09-26 DIAGNOSIS — Z87.891: ICD-10-CM

## 2022-09-26 DIAGNOSIS — G25.81: ICD-10-CM

## 2022-09-26 DIAGNOSIS — M86.671: ICD-10-CM

## 2022-09-26 DIAGNOSIS — I25.10: ICD-10-CM

## 2022-09-26 DIAGNOSIS — K21.9: ICD-10-CM

## 2022-09-26 DIAGNOSIS — L84: ICD-10-CM
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
## 2022-09-28 ENCOUNTER — HOSPITAL ENCOUNTER (OUTPATIENT)
Dept: HOSPITAL 83 - WOUNDCARE | Age: 67
Discharge: HOME | End: 2022-09-28
Attending: NURSE PRACTITIONER
Payer: MEDICARE

## 2022-09-28 DIAGNOSIS — K21.9: ICD-10-CM

## 2022-09-28 DIAGNOSIS — Z87.891: ICD-10-CM

## 2022-09-28 DIAGNOSIS — J44.9: ICD-10-CM

## 2022-09-28 DIAGNOSIS — I87.2: ICD-10-CM

## 2022-09-28 DIAGNOSIS — Z95.1: ICD-10-CM

## 2022-09-28 DIAGNOSIS — R60.9: ICD-10-CM

## 2022-09-28 DIAGNOSIS — L97.521: ICD-10-CM

## 2022-09-28 DIAGNOSIS — G60.9: ICD-10-CM

## 2022-09-28 DIAGNOSIS — N18.6: ICD-10-CM

## 2022-09-28 DIAGNOSIS — G25.81: ICD-10-CM

## 2022-09-28 DIAGNOSIS — E78.5: ICD-10-CM

## 2022-09-28 DIAGNOSIS — I10: ICD-10-CM
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
## 2022-09-28 DIAGNOSIS — M24.571: ICD-10-CM

## 2022-09-28 DIAGNOSIS — L97.514: ICD-10-CM

## 2022-09-28 DIAGNOSIS — M86.671: ICD-10-CM

## 2022-09-28 DIAGNOSIS — Z95.4: ICD-10-CM

## 2022-09-28 DIAGNOSIS — Z95.5: ICD-10-CM

## 2022-09-28 DIAGNOSIS — T86.828: Primary | ICD-10-CM

## 2022-09-28 DIAGNOSIS — Z99.2: ICD-10-CM

## 2022-09-28 DIAGNOSIS — I25.10: ICD-10-CM

## 2022-09-28 DIAGNOSIS — I73.9: ICD-10-CM

## 2022-10-03 ENCOUNTER — HOSPITAL ENCOUNTER (OUTPATIENT)
Dept: HOSPITAL 83 - LAB | Age: 67
Discharge: HOME | End: 2022-10-03
Payer: MEDICARE

## 2022-10-03 DIAGNOSIS — G25.81: ICD-10-CM

## 2022-10-03 DIAGNOSIS — Z89.412: ICD-10-CM

## 2022-10-03 DIAGNOSIS — K21.9: ICD-10-CM

## 2022-10-03 DIAGNOSIS — N18.6: ICD-10-CM

## 2022-10-03 DIAGNOSIS — I87.2: ICD-10-CM

## 2022-10-03 DIAGNOSIS — G60.9: ICD-10-CM

## 2022-10-03 DIAGNOSIS — Z95.5: ICD-10-CM

## 2022-10-03 DIAGNOSIS — M24.571: ICD-10-CM

## 2022-10-03 DIAGNOSIS — I25.10: ICD-10-CM

## 2022-10-03 DIAGNOSIS — R60.9: ICD-10-CM

## 2022-10-03 DIAGNOSIS — Z99.2: ICD-10-CM

## 2022-10-03 DIAGNOSIS — T86.828: Primary | ICD-10-CM

## 2022-10-03 DIAGNOSIS — L97.521: ICD-10-CM

## 2022-10-03 DIAGNOSIS — M86.671: ICD-10-CM
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
## 2022-10-03 DIAGNOSIS — Z95.1: ICD-10-CM

## 2022-10-03 DIAGNOSIS — I12.0: ICD-10-CM

## 2022-10-03 DIAGNOSIS — L97.514: ICD-10-CM

## 2022-10-03 DIAGNOSIS — J44.9: ICD-10-CM

## 2022-10-03 DIAGNOSIS — Z87.891: ICD-10-CM

## 2022-10-03 DIAGNOSIS — Z95.4: ICD-10-CM

## 2022-10-03 DIAGNOSIS — I73.9: ICD-10-CM

## 2022-10-03 DIAGNOSIS — E78.5: ICD-10-CM

## 2022-10-03 LAB
BASOPHILS # BLD AUTO: 0 10*3/UL (ref 0–0.1)
BASOPHILS NFR BLD AUTO: 0.5 % (ref 0–1)
EOSINOPHIL # BLD AUTO: 0.2 10*3/UL (ref 0–0.4)
EOSINOPHIL # BLD AUTO: 2.7 % (ref 1–4)
ERYTHROCYTE [DISTWIDTH] IN BLOOD BY AUTOMATED COUNT: 15.1 % (ref 0–14.5)
HCT VFR BLD AUTO: 37.3 % (ref 37–47)
LYMPHOCYTES # BLD AUTO: 0.7 10*3/UL (ref 1.3–4.4)
LYMPHOCYTES NFR BLD AUTO: 12 % (ref 27–41)
MCH RBC QN AUTO: 30.2 PG (ref 27–31)
MCHC RBC AUTO-ENTMCNC: 30.8 G/DL (ref 33–37)
MCV RBC AUTO: 97.9 FL (ref 81–99)
MONOCYTES # BLD AUTO: 0.2 10*3/UL (ref 0.1–1)
MONOCYTES NFR BLD MANUAL: 4.1 % (ref 3–9)
NEUT #: 4.7 10*3/UL (ref 2.3–7.9)
NEUT %: 80.4 % (ref 47–73)
NRBC BLD QL AUTO: 0 % (ref 0–0)
PLATELET # BLD AUTO: 252 10*3/UL (ref 130–400)
PMV BLD AUTO: 9.9 FL (ref 9.6–12.3)
RBC # BLD AUTO: 3.81 10*6/UL (ref 4.1–5.1)
WBC NRBC COR # BLD AUTO: 5.9 10*3/UL (ref 4.8–10.8)

## 2022-10-10 ENCOUNTER — HOSPITAL ENCOUNTER (OUTPATIENT)
Dept: HOSPITAL 83 - WOUNDCARE | Age: 67
End: 2022-10-10
Payer: MEDICARE

## 2022-10-10 DIAGNOSIS — I25.10: ICD-10-CM

## 2022-10-10 DIAGNOSIS — G25.81: ICD-10-CM

## 2022-10-10 DIAGNOSIS — Z89.412: ICD-10-CM

## 2022-10-10 DIAGNOSIS — Z95.5: ICD-10-CM

## 2022-10-10 DIAGNOSIS — L89.523: ICD-10-CM

## 2022-10-10 DIAGNOSIS — M86.671: ICD-10-CM

## 2022-10-10 DIAGNOSIS — L97.521: ICD-10-CM

## 2022-10-10 DIAGNOSIS — Z95.1: ICD-10-CM

## 2022-10-10 DIAGNOSIS — Z87.891: ICD-10-CM

## 2022-10-10 DIAGNOSIS — I73.9: ICD-10-CM

## 2022-10-10 DIAGNOSIS — E78.5: ICD-10-CM

## 2022-10-10 DIAGNOSIS — L89.893: ICD-10-CM

## 2022-10-10 DIAGNOSIS — M24.571: ICD-10-CM

## 2022-10-10 DIAGNOSIS — Z95.4: ICD-10-CM

## 2022-10-10 DIAGNOSIS — I12.0: ICD-10-CM

## 2022-10-10 DIAGNOSIS — G60.9: ICD-10-CM

## 2022-10-10 DIAGNOSIS — Z99.2: ICD-10-CM

## 2022-10-10 DIAGNOSIS — T81.89XA: ICD-10-CM

## 2022-10-10 DIAGNOSIS — T86.828: Primary | ICD-10-CM

## 2022-10-10 DIAGNOSIS — K21.9: ICD-10-CM
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
## 2022-10-10 DIAGNOSIS — I87.2: ICD-10-CM

## 2022-10-10 DIAGNOSIS — J44.9: ICD-10-CM

## 2022-10-10 DIAGNOSIS — L89.513: ICD-10-CM
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
## 2022-10-10 DIAGNOSIS — N18.6: ICD-10-CM

## 2022-10-10 DIAGNOSIS — L97.514: ICD-10-CM

## 2022-10-17 ENCOUNTER — HOSPITAL ENCOUNTER (OUTPATIENT)
Dept: HOSPITAL 83 - WOUNDCARE | Age: 67
Discharge: HOME | End: 2022-10-17
Payer: MEDICARE

## 2022-10-17 DIAGNOSIS — I25.10: ICD-10-CM

## 2022-10-17 DIAGNOSIS — G60.9: ICD-10-CM

## 2022-10-17 DIAGNOSIS — Z99.2: ICD-10-CM

## 2022-10-17 DIAGNOSIS — T81.89XD: ICD-10-CM

## 2022-10-17 DIAGNOSIS — Z95.4: ICD-10-CM

## 2022-10-17 DIAGNOSIS — T86.828: Primary | ICD-10-CM

## 2022-10-17 DIAGNOSIS — L97.514: ICD-10-CM
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
## 2022-10-17 DIAGNOSIS — Z87.891: ICD-10-CM

## 2022-10-17 DIAGNOSIS — E78.5: ICD-10-CM

## 2022-10-17 DIAGNOSIS — Z89.412: ICD-10-CM

## 2022-10-17 DIAGNOSIS — Z95.1: ICD-10-CM

## 2022-10-17 DIAGNOSIS — L97.521: ICD-10-CM

## 2022-10-17 DIAGNOSIS — Z95.5: ICD-10-CM

## 2022-10-17 DIAGNOSIS — G25.81: ICD-10-CM

## 2022-10-17 DIAGNOSIS — N18.6: ICD-10-CM

## 2022-10-17 DIAGNOSIS — J44.9: ICD-10-CM

## 2022-10-17 DIAGNOSIS — I87.2: ICD-10-CM

## 2022-10-17 DIAGNOSIS — K21.9: ICD-10-CM

## 2022-10-17 DIAGNOSIS — L89.523: ICD-10-CM
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
## 2022-10-17 DIAGNOSIS — I73.9: ICD-10-CM

## 2022-10-17 DIAGNOSIS — I12.0: ICD-10-CM

## 2022-10-17 DIAGNOSIS — M86.671: ICD-10-CM

## 2022-10-19 ENCOUNTER — HOSPITAL ENCOUNTER (OUTPATIENT)
Dept: HOSPITAL 83 - LAB | Age: 67
Discharge: HOME | End: 2022-10-19
Payer: MEDICARE

## 2022-10-19 DIAGNOSIS — I10: ICD-10-CM

## 2022-10-19 DIAGNOSIS — M86.671: Primary | ICD-10-CM

## 2022-10-19 LAB
BASOPHILS # BLD AUTO: 0 10*3/UL (ref 0–0.1)
BASOPHILS NFR BLD AUTO: 0.4 % (ref 0–1)
BUN SERPL-MCNC: 9 MG/DL (ref 7–24)
CHLORIDE SERPL-SCNC: 99 MMOL/L (ref 98–107)
CREAT SERPL-MCNC: 2.59 MG/DL (ref 0.55–1.02)
EOSINOPHIL # BLD AUTO: 0.2 10*3/UL (ref 0–0.4)
EOSINOPHIL # BLD AUTO: 3.4 % (ref 1–4)
ERYTHROCYTE [DISTWIDTH] IN BLOOD BY AUTOMATED COUNT: 15.4 % (ref 0–14.5)
HCT VFR BLD AUTO: 32.4 % (ref 37–47)
LYMPHOCYTES # BLD AUTO: 0.6 10*3/UL (ref 1.3–4.4)
LYMPHOCYTES NFR BLD AUTO: 12.7 % (ref 27–41)
MCH RBC QN AUTO: 30.3 PG (ref 27–31)
MCHC RBC AUTO-ENTMCNC: 30.6 G/DL (ref 33–37)
MCV RBC AUTO: 99.1 FL (ref 81–99)
MONOCYTES # BLD AUTO: 0.2 10*3/UL (ref 0.1–1)
MONOCYTES NFR BLD MANUAL: 4.7 % (ref 3–9)
NEUT #: 3.6 10*3/UL (ref 2.3–7.9)
NEUT %: 78.4 % (ref 47–73)
NRBC BLD QL AUTO: 0 10*3/UL (ref 0–0)
PLATELET # BLD AUTO: 244 10*3/UL (ref 130–400)
PMV BLD AUTO: 9 FL (ref 9.6–12.3)
POTASSIUM SERPL-SCNC: 3.4 MMOL/L (ref 3.5–5.1)
RBC # BLD AUTO: 3.27 10*6/UL (ref 4.1–5.1)
SODIUM SERPL-SCNC: 138 MMOL/L (ref 136–145)
WBC NRBC COR # BLD AUTO: 4.7 10*3/UL (ref 4.8–10.8)

## 2022-10-24 ENCOUNTER — HOSPITAL ENCOUNTER (OUTPATIENT)
Dept: HOSPITAL 83 - WOUNDCARE | Age: 67
Discharge: HOME | End: 2022-10-24
Attending: STUDENT IN AN ORGANIZED HEALTH CARE EDUCATION/TRAINING PROGRAM
Payer: MEDICARE

## 2022-10-24 ENCOUNTER — HOSPITAL ENCOUNTER (OUTPATIENT)
Dept: HOSPITAL 83 - WOUNDCARE | Age: 67
End: 2022-10-24
Payer: MEDICARE

## 2022-10-24 DIAGNOSIS — R60.9: ICD-10-CM

## 2022-10-24 DIAGNOSIS — Z99.2: ICD-10-CM

## 2022-10-24 DIAGNOSIS — N18.6: ICD-10-CM

## 2022-10-24 DIAGNOSIS — I25.10: ICD-10-CM

## 2022-10-24 DIAGNOSIS — L84: ICD-10-CM

## 2022-10-24 DIAGNOSIS — J44.9: ICD-10-CM

## 2022-10-24 DIAGNOSIS — L97.514: ICD-10-CM
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
## 2022-10-24 DIAGNOSIS — M24.571: ICD-10-CM

## 2022-10-24 DIAGNOSIS — I87.2: ICD-10-CM

## 2022-10-24 DIAGNOSIS — I12.0: ICD-10-CM

## 2022-10-24 DIAGNOSIS — L97.521: ICD-10-CM

## 2022-10-24 DIAGNOSIS — I73.9: ICD-10-CM

## 2022-10-24 DIAGNOSIS — Z95.4: ICD-10-CM

## 2022-10-24 DIAGNOSIS — T86.828: ICD-10-CM

## 2022-10-24 DIAGNOSIS — K21.9: ICD-10-CM

## 2022-10-24 DIAGNOSIS — T86.828: Primary | ICD-10-CM

## 2022-10-24 DIAGNOSIS — E78.5: ICD-10-CM

## 2022-10-24 DIAGNOSIS — G60.9: ICD-10-CM

## 2022-10-24 DIAGNOSIS — Z95.1: ICD-10-CM

## 2022-10-24 DIAGNOSIS — Z87.891: ICD-10-CM

## 2022-10-24 DIAGNOSIS — Z89.412: ICD-10-CM

## 2022-10-24 DIAGNOSIS — G25.81: ICD-10-CM

## 2022-10-24 DIAGNOSIS — M86.671: Primary | ICD-10-CM

## 2022-10-24 DIAGNOSIS — Z95.5: ICD-10-CM

## 2022-10-24 DIAGNOSIS — M86.671: ICD-10-CM

## 2022-10-25 ENCOUNTER — HOSPITAL ENCOUNTER (OUTPATIENT)
Dept: HOSPITAL 83 - WOUNDCARE | Age: 67
Discharge: HOME | End: 2022-10-25
Attending: NURSE PRACTITIONER
Payer: MEDICARE

## 2022-10-25 DIAGNOSIS — Z89.412: ICD-10-CM

## 2022-10-25 DIAGNOSIS — I73.9: ICD-10-CM

## 2022-10-25 DIAGNOSIS — I25.10: ICD-10-CM

## 2022-10-25 DIAGNOSIS — Z99.2: ICD-10-CM

## 2022-10-25 DIAGNOSIS — Z87.891: ICD-10-CM

## 2022-10-25 DIAGNOSIS — R60.9: ICD-10-CM

## 2022-10-25 DIAGNOSIS — I87.2: ICD-10-CM

## 2022-10-25 DIAGNOSIS — L97.514: ICD-10-CM

## 2022-10-25 DIAGNOSIS — Z95.1: ICD-10-CM

## 2022-10-25 DIAGNOSIS — K21.9: ICD-10-CM

## 2022-10-25 DIAGNOSIS — J44.9: ICD-10-CM

## 2022-10-25 DIAGNOSIS — T86.828: ICD-10-CM

## 2022-10-25 DIAGNOSIS — E78.5: ICD-10-CM

## 2022-10-25 DIAGNOSIS — L97.521: ICD-10-CM

## 2022-10-25 DIAGNOSIS — G60.9: ICD-10-CM

## 2022-10-25 DIAGNOSIS — M86.671: Primary | ICD-10-CM

## 2022-10-25 DIAGNOSIS — I12.0: ICD-10-CM

## 2022-10-25 DIAGNOSIS — N18.6: ICD-10-CM

## 2022-10-25 DIAGNOSIS — M24.571: ICD-10-CM
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
## 2022-10-25 DIAGNOSIS — G25.81: ICD-10-CM

## 2022-10-25 DIAGNOSIS — Z95.5: ICD-10-CM

## 2022-10-26 ENCOUNTER — HOSPITAL ENCOUNTER (OUTPATIENT)
Dept: HOSPITAL 83 - WOUNDCARE | Age: 67
Discharge: HOME | End: 2022-10-26
Attending: NURSE PRACTITIONER
Payer: MEDICARE

## 2022-10-26 ENCOUNTER — HOSPITAL ENCOUNTER (OUTPATIENT)
Dept: HOSPITAL 83 - SDC | Age: 67
Discharge: HOME | End: 2022-10-26
Attending: PODIATRIST
Payer: MEDICARE

## 2022-10-26 VITALS — BODY MASS INDEX: 36.32 KG/M2 | WEIGHT: 205 LBS | HEIGHT: 62.99 IN

## 2022-10-26 VITALS — DIASTOLIC BLOOD PRESSURE: 62 MMHG

## 2022-10-26 VITALS — DIASTOLIC BLOOD PRESSURE: 33 MMHG

## 2022-10-26 VITALS — DIASTOLIC BLOOD PRESSURE: 45 MMHG

## 2022-10-26 VITALS — DIASTOLIC BLOOD PRESSURE: 31 MMHG | SYSTOLIC BLOOD PRESSURE: 81 MMHG

## 2022-10-26 VITALS — DIASTOLIC BLOOD PRESSURE: 29 MMHG

## 2022-10-26 DIAGNOSIS — N18.6: ICD-10-CM
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
## 2022-10-26 DIAGNOSIS — Z89.412: ICD-10-CM

## 2022-10-26 DIAGNOSIS — G25.81: ICD-10-CM

## 2022-10-26 DIAGNOSIS — I50.9: ICD-10-CM

## 2022-10-26 DIAGNOSIS — Z95.1: ICD-10-CM

## 2022-10-26 DIAGNOSIS — I25.10: ICD-10-CM

## 2022-10-26 DIAGNOSIS — M86.171: ICD-10-CM

## 2022-10-26 DIAGNOSIS — I12.0: ICD-10-CM

## 2022-10-26 DIAGNOSIS — Z79.82: ICD-10-CM

## 2022-10-26 DIAGNOSIS — K21.9: ICD-10-CM

## 2022-10-26 DIAGNOSIS — L97.514: ICD-10-CM

## 2022-10-26 DIAGNOSIS — Z87.891: ICD-10-CM

## 2022-10-26 DIAGNOSIS — G60.9: ICD-10-CM

## 2022-10-26 DIAGNOSIS — M86.671: Primary | ICD-10-CM

## 2022-10-26 DIAGNOSIS — J44.9: ICD-10-CM

## 2022-10-26 DIAGNOSIS — Z99.2: ICD-10-CM

## 2022-10-26 DIAGNOSIS — S91.301A: Primary | ICD-10-CM
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
## 2022-10-26 DIAGNOSIS — L97.521: ICD-10-CM

## 2022-10-26 DIAGNOSIS — M24.571: ICD-10-CM

## 2022-10-26 DIAGNOSIS — I25.2: ICD-10-CM

## 2022-10-26 DIAGNOSIS — I11.0: ICD-10-CM

## 2022-10-26 DIAGNOSIS — Z98.890: ICD-10-CM

## 2022-10-26 DIAGNOSIS — I73.9: ICD-10-CM

## 2022-10-26 DIAGNOSIS — R60.9: ICD-10-CM
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
## 2022-10-26 DIAGNOSIS — T86.828: ICD-10-CM

## 2022-10-26 DIAGNOSIS — I87.2: ICD-10-CM

## 2022-10-26 DIAGNOSIS — E78.5: ICD-10-CM
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
## 2022-10-27 ENCOUNTER — HOSPITAL ENCOUNTER (OUTPATIENT)
Dept: HOSPITAL 83 - WOUNDCARE | Age: 67
End: 2022-10-27
Attending: NURSE PRACTITIONER
Payer: MEDICARE
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
## 2022-10-27 DIAGNOSIS — J44.9: ICD-10-CM

## 2022-10-27 DIAGNOSIS — I25.10: ICD-10-CM

## 2022-10-27 DIAGNOSIS — Z89.412: ICD-10-CM

## 2022-10-27 DIAGNOSIS — N18.6: ICD-10-CM

## 2022-10-27 DIAGNOSIS — E78.5: ICD-10-CM

## 2022-10-27 DIAGNOSIS — L97.521: ICD-10-CM

## 2022-10-27 DIAGNOSIS — I87.2: ICD-10-CM

## 2022-10-27 DIAGNOSIS — I12.0: ICD-10-CM

## 2022-10-27 DIAGNOSIS — K21.9: ICD-10-CM

## 2022-10-27 DIAGNOSIS — R60.9: ICD-10-CM

## 2022-10-27 DIAGNOSIS — M86.671: Primary | ICD-10-CM

## 2022-10-27 DIAGNOSIS — L97.514: ICD-10-CM

## 2022-10-27 DIAGNOSIS — Z95.1: ICD-10-CM

## 2022-10-27 DIAGNOSIS — Z95.5: ICD-10-CM

## 2022-10-27 DIAGNOSIS — G60.9: ICD-10-CM

## 2022-10-27 DIAGNOSIS — Z99.2: ICD-10-CM

## 2022-10-27 DIAGNOSIS — Z95.4: ICD-10-CM

## 2022-10-27 DIAGNOSIS — Z87.891: ICD-10-CM

## 2022-10-27 DIAGNOSIS — T86.828: ICD-10-CM

## 2022-10-27 DIAGNOSIS — G25.81: ICD-10-CM

## 2022-10-27 DIAGNOSIS — M24.571: ICD-10-CM

## 2022-10-27 LAB — SPECIMEN PREPARATION: (no result)

## 2022-10-28 ENCOUNTER — HOSPITAL ENCOUNTER (OUTPATIENT)
Dept: HOSPITAL 83 - WOUNDCARE | Age: 67
Discharge: HOME | End: 2022-10-28
Attending: NURSE PRACTITIONER
Payer: MEDICARE

## 2022-10-28 DIAGNOSIS — E78.5: ICD-10-CM

## 2022-10-28 DIAGNOSIS — N18.6: ICD-10-CM

## 2022-10-28 DIAGNOSIS — M24.571: ICD-10-CM

## 2022-10-28 DIAGNOSIS — Z99.2: ICD-10-CM

## 2022-10-28 DIAGNOSIS — G60.9: ICD-10-CM

## 2022-10-28 DIAGNOSIS — R60.9: ICD-10-CM

## 2022-10-28 DIAGNOSIS — I87.2: ICD-10-CM

## 2022-10-28 DIAGNOSIS — I73.9: ICD-10-CM

## 2022-10-28 DIAGNOSIS — M86.671: Primary | ICD-10-CM

## 2022-10-28 DIAGNOSIS — Z95.5: ICD-10-CM

## 2022-10-28 DIAGNOSIS — Z95.1: ICD-10-CM

## 2022-10-28 DIAGNOSIS — K21.9: ICD-10-CM

## 2022-10-28 DIAGNOSIS — T86.828: ICD-10-CM

## 2022-10-28 DIAGNOSIS — L97.521: ICD-10-CM
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
## 2022-10-28 DIAGNOSIS — G25.81: ICD-10-CM

## 2022-10-28 DIAGNOSIS — L97.514: ICD-10-CM

## 2022-10-28 DIAGNOSIS — Z87.891: ICD-10-CM

## 2022-10-28 DIAGNOSIS — J44.9: ICD-10-CM

## 2022-10-28 DIAGNOSIS — I25.10: ICD-10-CM

## 2022-10-28 DIAGNOSIS — Z89.412: ICD-10-CM

## 2022-10-28 DIAGNOSIS — I12.0: ICD-10-CM

## 2022-10-31 ENCOUNTER — HOSPITAL ENCOUNTER (OUTPATIENT)
Dept: HOSPITAL 83 - WOUNDCARE | Age: 67
Discharge: HOME | End: 2022-10-31
Attending: STUDENT IN AN ORGANIZED HEALTH CARE EDUCATION/TRAINING PROGRAM
Payer: MEDICARE

## 2022-10-31 DIAGNOSIS — T81.89XD: ICD-10-CM

## 2022-10-31 DIAGNOSIS — M24.571: ICD-10-CM
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
## 2022-10-31 DIAGNOSIS — Z99.2: ICD-10-CM

## 2022-10-31 DIAGNOSIS — J44.9: ICD-10-CM

## 2022-10-31 DIAGNOSIS — T86.828: Primary | ICD-10-CM

## 2022-10-31 DIAGNOSIS — Z89.412: ICD-10-CM

## 2022-10-31 DIAGNOSIS — I87.2: ICD-10-CM

## 2022-10-31 DIAGNOSIS — L97.521: ICD-10-CM

## 2022-10-31 DIAGNOSIS — E78.5: ICD-10-CM

## 2022-10-31 DIAGNOSIS — K21.9: ICD-10-CM

## 2022-10-31 DIAGNOSIS — Z95.5: ICD-10-CM

## 2022-10-31 DIAGNOSIS — Z87.891: ICD-10-CM

## 2022-10-31 DIAGNOSIS — L97.514: ICD-10-CM

## 2022-10-31 DIAGNOSIS — G60.9: ICD-10-CM

## 2022-10-31 DIAGNOSIS — G25.81: ICD-10-CM

## 2022-10-31 DIAGNOSIS — N18.6: ICD-10-CM

## 2022-10-31 DIAGNOSIS — Z95.4: ICD-10-CM

## 2022-10-31 DIAGNOSIS — I73.9: ICD-10-CM

## 2022-10-31 DIAGNOSIS — R60.9: ICD-10-CM

## 2022-10-31 DIAGNOSIS — Z95.1: ICD-10-CM

## 2022-10-31 DIAGNOSIS — I12.0: ICD-10-CM
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
## 2022-10-31 DIAGNOSIS — I25.10: ICD-10-CM

## 2022-10-31 DIAGNOSIS — M86.671: ICD-10-CM

## 2022-11-02 ENCOUNTER — HOSPITAL ENCOUNTER (OUTPATIENT)
Dept: HOSPITAL 83 - WOUNDCARE | Age: 67
End: 2022-11-02
Attending: NURSE PRACTITIONER
Payer: MEDICARE

## 2022-11-02 DIAGNOSIS — Z95.5: ICD-10-CM

## 2022-11-02 DIAGNOSIS — Z87.891: ICD-10-CM

## 2022-11-02 DIAGNOSIS — K21.9: ICD-10-CM

## 2022-11-02 DIAGNOSIS — J44.9: ICD-10-CM

## 2022-11-02 DIAGNOSIS — Z95.1: ICD-10-CM

## 2022-11-02 DIAGNOSIS — E78.5: ICD-10-CM

## 2022-11-02 DIAGNOSIS — T86.828: ICD-10-CM

## 2022-11-02 DIAGNOSIS — L97.514: ICD-10-CM

## 2022-11-02 DIAGNOSIS — Z99.2: ICD-10-CM
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
## 2022-11-02 DIAGNOSIS — I25.10: ICD-10-CM

## 2022-11-02 DIAGNOSIS — I12.0: ICD-10-CM

## 2022-11-02 DIAGNOSIS — I73.9: ICD-10-CM

## 2022-11-02 DIAGNOSIS — M24.471: ICD-10-CM

## 2022-11-02 DIAGNOSIS — I87.2: ICD-10-CM

## 2022-11-02 DIAGNOSIS — G60.9: ICD-10-CM

## 2022-11-02 DIAGNOSIS — G25.81: ICD-10-CM

## 2022-11-02 DIAGNOSIS — N18.6: ICD-10-CM

## 2022-11-02 DIAGNOSIS — R60.9: ICD-10-CM

## 2022-11-02 DIAGNOSIS — M86.671: Primary | ICD-10-CM

## 2022-11-02 DIAGNOSIS — L97.521: ICD-10-CM

## 2022-11-02 DIAGNOSIS — Z89.412: ICD-10-CM

## 2022-11-03 ENCOUNTER — HOSPITAL ENCOUNTER (OUTPATIENT)
Dept: HOSPITAL 83 - WOUNDCARE | Age: 67
End: 2022-11-03
Attending: NURSE PRACTITIONER
Payer: MEDICARE

## 2022-11-03 DIAGNOSIS — I87.2: ICD-10-CM

## 2022-11-03 DIAGNOSIS — J44.9: ICD-10-CM

## 2022-11-03 DIAGNOSIS — L97.521: ICD-10-CM

## 2022-11-03 DIAGNOSIS — K21.9: ICD-10-CM

## 2022-11-03 DIAGNOSIS — E78.5: ICD-10-CM

## 2022-11-03 DIAGNOSIS — Z99.2: ICD-10-CM

## 2022-11-03 DIAGNOSIS — M86.671: Primary | ICD-10-CM

## 2022-11-03 DIAGNOSIS — I25.10: ICD-10-CM

## 2022-11-03 DIAGNOSIS — Z89.412: ICD-10-CM

## 2022-11-03 DIAGNOSIS — Z95.5: ICD-10-CM

## 2022-11-03 DIAGNOSIS — Z95.1: ICD-10-CM

## 2022-11-03 DIAGNOSIS — M24.571: ICD-10-CM

## 2022-11-03 DIAGNOSIS — R60.9: ICD-10-CM

## 2022-11-03 DIAGNOSIS — I12.0: ICD-10-CM

## 2022-11-03 DIAGNOSIS — G25.81: ICD-10-CM

## 2022-11-03 DIAGNOSIS — N18.6: ICD-10-CM

## 2022-11-03 DIAGNOSIS — I73.9: ICD-10-CM

## 2022-11-03 DIAGNOSIS — L97.514: ICD-10-CM

## 2022-11-03 DIAGNOSIS — T86.828: ICD-10-CM

## 2022-11-03 DIAGNOSIS — Z95.4: ICD-10-CM

## 2022-11-03 DIAGNOSIS — G60.9: ICD-10-CM
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
## 2022-11-04 ENCOUNTER — HOSPITAL ENCOUNTER (OUTPATIENT)
Dept: HOSPITAL 83 - WOUNDCARE | Age: 67
Discharge: HOME | End: 2022-11-04
Attending: NURSE PRACTITIONER
Payer: MEDICARE

## 2022-11-04 DIAGNOSIS — M86.671: Primary | ICD-10-CM

## 2022-11-04 DIAGNOSIS — R60.9: ICD-10-CM

## 2022-11-04 DIAGNOSIS — J44.9: ICD-10-CM

## 2022-11-04 DIAGNOSIS — L97.521: ICD-10-CM

## 2022-11-04 DIAGNOSIS — K21.9: ICD-10-CM

## 2022-11-04 DIAGNOSIS — Z99.2: ICD-10-CM

## 2022-11-04 DIAGNOSIS — N18.6: ICD-10-CM

## 2022-11-04 DIAGNOSIS — Z95.4: ICD-10-CM

## 2022-11-04 DIAGNOSIS — G25.81: ICD-10-CM

## 2022-11-04 DIAGNOSIS — I87.2: ICD-10-CM

## 2022-11-04 DIAGNOSIS — I12.0: ICD-10-CM

## 2022-11-04 DIAGNOSIS — I25.10: ICD-10-CM

## 2022-11-04 DIAGNOSIS — M24.571: ICD-10-CM

## 2022-11-04 DIAGNOSIS — E78.5: ICD-10-CM

## 2022-11-04 DIAGNOSIS — G60.9: ICD-10-CM

## 2022-11-04 DIAGNOSIS — Z95.1: ICD-10-CM

## 2022-11-04 DIAGNOSIS — Z89.412: ICD-10-CM

## 2022-11-04 DIAGNOSIS — I73.9: ICD-10-CM

## 2022-11-04 DIAGNOSIS — L97.514: ICD-10-CM

## 2022-11-04 DIAGNOSIS — Z95.5: ICD-10-CM

## 2022-11-04 DIAGNOSIS — T86.828: ICD-10-CM
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
## 2022-11-04 DIAGNOSIS — Z87.891: ICD-10-CM

## 2022-11-07 ENCOUNTER — HOSPITAL ENCOUNTER (OUTPATIENT)
Dept: HOSPITAL 83 - WOUNDCARE | Age: 67
Discharge: HOME | End: 2022-11-07
Attending: STUDENT IN AN ORGANIZED HEALTH CARE EDUCATION/TRAINING PROGRAM
Payer: MEDICARE

## 2022-11-07 DIAGNOSIS — Z95.5: ICD-10-CM

## 2022-11-07 DIAGNOSIS — E78.5: ICD-10-CM

## 2022-11-07 DIAGNOSIS — R60.9: ICD-10-CM

## 2022-11-07 DIAGNOSIS — N18.6: ICD-10-CM

## 2022-11-07 DIAGNOSIS — Z87.891: ICD-10-CM

## 2022-11-07 DIAGNOSIS — G25.81: ICD-10-CM

## 2022-11-07 DIAGNOSIS — I87.2: ICD-10-CM

## 2022-11-07 DIAGNOSIS — Z95.4: ICD-10-CM

## 2022-11-07 DIAGNOSIS — G60.9: ICD-10-CM

## 2022-11-07 DIAGNOSIS — Z89.412: ICD-10-CM

## 2022-11-07 DIAGNOSIS — L97.521: ICD-10-CM

## 2022-11-07 DIAGNOSIS — I25.10: ICD-10-CM

## 2022-11-07 DIAGNOSIS — L97.514: ICD-10-CM

## 2022-11-07 DIAGNOSIS — I12.0: ICD-10-CM

## 2022-11-07 DIAGNOSIS — T86.828: ICD-10-CM

## 2022-11-07 DIAGNOSIS — Z95.1: ICD-10-CM
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
## 2022-11-07 DIAGNOSIS — J44.9: ICD-10-CM

## 2022-11-07 DIAGNOSIS — M86.671: Primary | ICD-10-CM

## 2022-11-07 DIAGNOSIS — Z99.2: ICD-10-CM

## 2022-11-07 DIAGNOSIS — I73.9: ICD-10-CM

## 2022-11-07 DIAGNOSIS — M24.571: ICD-10-CM

## 2022-11-07 DIAGNOSIS — K21.9: ICD-10-CM

## 2022-11-11 ENCOUNTER — HOSPITAL ENCOUNTER (OUTPATIENT)
Dept: HOSPITAL 83 - WOUNDCARE | Age: 67
Discharge: HOME | End: 2022-11-11
Attending: NURSE PRACTITIONER
Payer: MEDICARE

## 2022-11-11 DIAGNOSIS — Z87.891: ICD-10-CM

## 2022-11-11 DIAGNOSIS — L97.514: ICD-10-CM

## 2022-11-11 DIAGNOSIS — I73.9: ICD-10-CM

## 2022-11-11 DIAGNOSIS — I25.10: ICD-10-CM

## 2022-11-11 DIAGNOSIS — Z89.412: ICD-10-CM

## 2022-11-11 DIAGNOSIS — M24.571: ICD-10-CM

## 2022-11-11 DIAGNOSIS — Z95.4: ICD-10-CM

## 2022-11-11 DIAGNOSIS — G25.81: ICD-10-CM

## 2022-11-11 DIAGNOSIS — G60.9: ICD-10-CM

## 2022-11-11 DIAGNOSIS — Z95.1: ICD-10-CM

## 2022-11-11 DIAGNOSIS — Z95.5: ICD-10-CM

## 2022-11-11 DIAGNOSIS — R60.9: ICD-10-CM

## 2022-11-11 DIAGNOSIS — J44.9: ICD-10-CM

## 2022-11-11 DIAGNOSIS — M86.671: Primary | ICD-10-CM

## 2022-11-11 DIAGNOSIS — I87.2: ICD-10-CM

## 2022-11-11 DIAGNOSIS — E78.5: ICD-10-CM

## 2022-11-11 DIAGNOSIS — Z99.2: ICD-10-CM

## 2022-11-11 DIAGNOSIS — I12.0: ICD-10-CM

## 2022-11-11 DIAGNOSIS — L97.521: ICD-10-CM

## 2022-11-11 DIAGNOSIS — T86.828: ICD-10-CM

## 2022-11-11 DIAGNOSIS — K21.9: ICD-10-CM

## 2022-11-11 DIAGNOSIS — N18.6: ICD-10-CM

## 2022-11-14 ENCOUNTER — HOSPITAL ENCOUNTER (OUTPATIENT)
Dept: HOSPITAL 83 - WOUNDCARE | Age: 67
Discharge: HOME | End: 2022-11-14
Attending: STUDENT IN AN ORGANIZED HEALTH CARE EDUCATION/TRAINING PROGRAM
Payer: MEDICARE

## 2022-11-14 DIAGNOSIS — M24.571: ICD-10-CM

## 2022-11-14 DIAGNOSIS — G60.9: ICD-10-CM

## 2022-11-14 DIAGNOSIS — L97.521: ICD-10-CM
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
## 2022-11-14 DIAGNOSIS — T86.828: ICD-10-CM

## 2022-11-14 DIAGNOSIS — R60.9: ICD-10-CM

## 2022-11-14 DIAGNOSIS — Z95.5: ICD-10-CM

## 2022-11-14 DIAGNOSIS — I73.9: ICD-10-CM

## 2022-11-14 DIAGNOSIS — I25.10: ICD-10-CM

## 2022-11-14 DIAGNOSIS — T81.89XD: ICD-10-CM

## 2022-11-14 DIAGNOSIS — J44.9: ICD-10-CM

## 2022-11-14 DIAGNOSIS — E78.5: ICD-10-CM

## 2022-11-14 DIAGNOSIS — M86.671: Primary | ICD-10-CM
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
## 2022-11-14 DIAGNOSIS — G25.81: ICD-10-CM

## 2022-11-14 DIAGNOSIS — L97.514: ICD-10-CM

## 2022-11-14 DIAGNOSIS — Z89.412: ICD-10-CM

## 2022-11-14 DIAGNOSIS — Z95.1: ICD-10-CM

## 2022-11-14 DIAGNOSIS — Z87.891: ICD-10-CM

## 2022-11-14 DIAGNOSIS — I12.0: ICD-10-CM

## 2022-11-14 DIAGNOSIS — N18.6: ICD-10-CM

## 2022-11-14 DIAGNOSIS — K21.9: ICD-10-CM

## 2022-11-14 DIAGNOSIS — Z99.2: ICD-10-CM

## 2022-11-14 DIAGNOSIS — Z95.4: ICD-10-CM

## 2022-11-14 DIAGNOSIS — I87.2: ICD-10-CM

## 2022-11-16 ENCOUNTER — HOSPITAL ENCOUNTER (OUTPATIENT)
Dept: HOSPITAL 83 - WOUNDCARE | Age: 67
Discharge: HOME | End: 2022-11-16
Attending: NURSE PRACTITIONER
Payer: MEDICARE

## 2022-11-16 DIAGNOSIS — T86.828: ICD-10-CM

## 2022-11-16 DIAGNOSIS — M24.571: ICD-10-CM

## 2022-11-16 DIAGNOSIS — K21.9: ICD-10-CM

## 2022-11-16 DIAGNOSIS — Z99.2: ICD-10-CM

## 2022-11-16 DIAGNOSIS — N18.6: ICD-10-CM

## 2022-11-16 DIAGNOSIS — Z95.5: ICD-10-CM

## 2022-11-16 DIAGNOSIS — Z95.1: ICD-10-CM

## 2022-11-16 DIAGNOSIS — M86.671: Primary | ICD-10-CM

## 2022-11-16 DIAGNOSIS — Z95.4: ICD-10-CM

## 2022-11-16 DIAGNOSIS — E78.5: ICD-10-CM

## 2022-11-16 DIAGNOSIS — R60.9: ICD-10-CM

## 2022-11-16 DIAGNOSIS — I12.0: ICD-10-CM
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
## 2022-11-16 DIAGNOSIS — Z89.412: ICD-10-CM

## 2022-11-16 DIAGNOSIS — L97.521: ICD-10-CM

## 2022-11-16 DIAGNOSIS — I73.9: ICD-10-CM

## 2022-11-16 DIAGNOSIS — I25.10: ICD-10-CM

## 2022-11-16 DIAGNOSIS — Z87.891: ICD-10-CM

## 2022-11-16 DIAGNOSIS — L97.514: ICD-10-CM

## 2022-11-16 DIAGNOSIS — G25.81: ICD-10-CM

## 2022-11-16 DIAGNOSIS — J44.9: ICD-10-CM

## 2022-11-16 DIAGNOSIS — G60.9: ICD-10-CM

## 2022-11-16 DIAGNOSIS — I87.2: ICD-10-CM

## 2022-11-21 ENCOUNTER — HOSPITAL ENCOUNTER (OUTPATIENT)
Dept: HOSPITAL 83 - WOUNDCARE | Age: 67
Discharge: HOME | End: 2022-11-21
Attending: STUDENT IN AN ORGANIZED HEALTH CARE EDUCATION/TRAINING PROGRAM
Payer: MEDICARE

## 2022-11-21 ENCOUNTER — HOSPITAL ENCOUNTER (OUTPATIENT)
Dept: HOSPITAL 83 - WOUNDCARE | Age: 67
Discharge: HOME | End: 2022-11-21
Attending: NURSE PRACTITIONER
Payer: MEDICARE

## 2022-11-21 DIAGNOSIS — Z99.2: ICD-10-CM

## 2022-11-21 DIAGNOSIS — I73.9: ICD-10-CM

## 2022-11-21 DIAGNOSIS — Z95.5: ICD-10-CM

## 2022-11-21 DIAGNOSIS — I87.2: ICD-10-CM

## 2022-11-21 DIAGNOSIS — I12.0: ICD-10-CM
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
## 2022-11-21 DIAGNOSIS — N18.6: ICD-10-CM

## 2022-11-21 DIAGNOSIS — T86.828: ICD-10-CM

## 2022-11-21 DIAGNOSIS — Z89.412: ICD-10-CM

## 2022-11-21 DIAGNOSIS — E78.5: ICD-10-CM

## 2022-11-21 DIAGNOSIS — J44.9: ICD-10-CM

## 2022-11-21 DIAGNOSIS — R60.9: ICD-10-CM

## 2022-11-21 DIAGNOSIS — K21.9: ICD-10-CM

## 2022-11-21 DIAGNOSIS — I25.10: ICD-10-CM

## 2022-11-21 DIAGNOSIS — L97.521: ICD-10-CM

## 2022-11-21 DIAGNOSIS — Z95.4: ICD-10-CM

## 2022-11-21 DIAGNOSIS — M24.571: ICD-10-CM

## 2022-11-21 DIAGNOSIS — L97.514: ICD-10-CM

## 2022-11-21 DIAGNOSIS — G60.9: ICD-10-CM

## 2022-11-21 DIAGNOSIS — L84: ICD-10-CM

## 2022-11-21 DIAGNOSIS — G25.81: ICD-10-CM

## 2022-11-21 DIAGNOSIS — M86.671: Primary | ICD-10-CM

## 2022-11-21 DIAGNOSIS — Z95.1: ICD-10-CM

## 2022-11-21 DIAGNOSIS — Z87.891: ICD-10-CM

## 2022-11-28 ENCOUNTER — HOSPITAL ENCOUNTER (OUTPATIENT)
Dept: HOSPITAL 83 - WOUNDCARE | Age: 67
Discharge: HOME | End: 2022-11-28
Attending: STUDENT IN AN ORGANIZED HEALTH CARE EDUCATION/TRAINING PROGRAM
Payer: MEDICARE
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
## 2022-11-28 DIAGNOSIS — Z95.1: ICD-10-CM

## 2022-11-28 DIAGNOSIS — L97.514: ICD-10-CM

## 2022-11-28 DIAGNOSIS — I73.9: ICD-10-CM

## 2022-11-28 DIAGNOSIS — K21.9: ICD-10-CM

## 2022-11-28 DIAGNOSIS — M86.671: Primary | ICD-10-CM

## 2022-11-28 DIAGNOSIS — J44.9: ICD-10-CM

## 2022-11-28 DIAGNOSIS — T86.828: ICD-10-CM

## 2022-11-28 DIAGNOSIS — E78.5: ICD-10-CM

## 2022-11-28 DIAGNOSIS — L97.521: ICD-10-CM

## 2022-11-28 DIAGNOSIS — Z95.5: ICD-10-CM

## 2022-11-28 DIAGNOSIS — M24.571: ICD-10-CM

## 2022-11-28 DIAGNOSIS — G60.9: ICD-10-CM

## 2022-11-28 DIAGNOSIS — N18.6: ICD-10-CM

## 2022-11-28 DIAGNOSIS — Z99.2: ICD-10-CM

## 2022-11-28 DIAGNOSIS — L84: ICD-10-CM

## 2022-11-28 DIAGNOSIS — Z89.412: ICD-10-CM

## 2022-11-28 DIAGNOSIS — I87.2: ICD-10-CM

## 2022-11-28 DIAGNOSIS — Z87.891: ICD-10-CM

## 2022-11-28 DIAGNOSIS — I12.0: ICD-10-CM

## 2022-11-28 DIAGNOSIS — I25.10: ICD-10-CM

## 2022-11-28 DIAGNOSIS — R60.9: ICD-10-CM

## 2022-11-28 DIAGNOSIS — G25.81: ICD-10-CM

## 2022-11-28 DIAGNOSIS — Z95.4: ICD-10-CM

## 2022-11-30 ENCOUNTER — HOSPITAL ENCOUNTER (OUTPATIENT)
Dept: HOSPITAL 83 - WOUNDCARE | Age: 67
Discharge: HOME | End: 2022-11-30
Attending: NURSE PRACTITIONER
Payer: MEDICARE

## 2022-11-30 DIAGNOSIS — G60.9: ICD-10-CM

## 2022-11-30 DIAGNOSIS — I73.9: ICD-10-CM

## 2022-11-30 DIAGNOSIS — K21.9: ICD-10-CM
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
## 2022-11-30 DIAGNOSIS — G25.81: ICD-10-CM

## 2022-11-30 DIAGNOSIS — E78.5: ICD-10-CM

## 2022-11-30 DIAGNOSIS — Z89.412: ICD-10-CM

## 2022-11-30 DIAGNOSIS — L97.514: ICD-10-CM

## 2022-11-30 DIAGNOSIS — M86.671: Primary | ICD-10-CM

## 2022-11-30 DIAGNOSIS — R60.9: ICD-10-CM

## 2022-11-30 DIAGNOSIS — I87.2: ICD-10-CM

## 2022-11-30 DIAGNOSIS — Z95.5: ICD-10-CM

## 2022-11-30 DIAGNOSIS — M24.571: ICD-10-CM

## 2022-11-30 DIAGNOSIS — T86.828: ICD-10-CM

## 2022-11-30 DIAGNOSIS — I12.0: ICD-10-CM

## 2022-11-30 DIAGNOSIS — Z95.4: ICD-10-CM

## 2022-11-30 DIAGNOSIS — Z99.2: ICD-10-CM

## 2022-11-30 DIAGNOSIS — L97.521: ICD-10-CM

## 2022-11-30 DIAGNOSIS — J44.9: ICD-10-CM

## 2022-11-30 DIAGNOSIS — Z87.891: ICD-10-CM

## 2022-11-30 DIAGNOSIS — I25.10: ICD-10-CM

## 2022-11-30 DIAGNOSIS — N18.6: ICD-10-CM

## 2022-11-30 DIAGNOSIS — Z95.1: ICD-10-CM

## 2022-12-01 ENCOUNTER — HOSPITAL ENCOUNTER (OUTPATIENT)
Dept: HOSPITAL 83 - WOUNDCARE | Age: 67
Discharge: HOME | End: 2022-12-01
Attending: STUDENT IN AN ORGANIZED HEALTH CARE EDUCATION/TRAINING PROGRAM
Payer: MEDICARE

## 2022-12-01 DIAGNOSIS — Z87.891: ICD-10-CM

## 2022-12-01 DIAGNOSIS — L97.514: ICD-10-CM

## 2022-12-01 DIAGNOSIS — Z95.1: ICD-10-CM

## 2022-12-01 DIAGNOSIS — Z95.4: ICD-10-CM

## 2022-12-01 DIAGNOSIS — Z89.412: ICD-10-CM

## 2022-12-01 DIAGNOSIS — I12.0: ICD-10-CM

## 2022-12-01 DIAGNOSIS — N18.6: ICD-10-CM

## 2022-12-01 DIAGNOSIS — L97.521: ICD-10-CM

## 2022-12-01 DIAGNOSIS — E78.5: ICD-10-CM

## 2022-12-01 DIAGNOSIS — M86.671: Primary | ICD-10-CM

## 2022-12-01 DIAGNOSIS — I25.10: ICD-10-CM

## 2022-12-01 DIAGNOSIS — G25.81: ICD-10-CM

## 2022-12-01 DIAGNOSIS — J44.9: ICD-10-CM

## 2022-12-01 DIAGNOSIS — R60.9: ICD-10-CM

## 2022-12-01 DIAGNOSIS — G60.9: ICD-10-CM
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
## 2022-12-01 DIAGNOSIS — I87.2: ICD-10-CM

## 2022-12-01 DIAGNOSIS — K21.9: ICD-10-CM

## 2022-12-01 DIAGNOSIS — M24.571: ICD-10-CM

## 2022-12-01 DIAGNOSIS — Z95.5: ICD-10-CM

## 2022-12-01 DIAGNOSIS — T86.828: ICD-10-CM

## 2022-12-01 DIAGNOSIS — I73.9: ICD-10-CM

## 2022-12-01 DIAGNOSIS — Z99.2: ICD-10-CM

## 2022-12-02 ENCOUNTER — HOSPITAL ENCOUNTER (OUTPATIENT)
Dept: HOSPITAL 83 - LAB | Age: 67
Discharge: HOME | End: 2022-12-02
Attending: INTERNAL MEDICINE
Payer: MEDICARE

## 2022-12-02 DIAGNOSIS — M86.171: Primary | ICD-10-CM

## 2022-12-02 LAB
BASOPHILS # BLD AUTO: 0 10*3/UL (ref 0–0.1)
BASOPHILS NFR BLD AUTO: 0.5 % (ref 0–1)
BUN SERPL-MCNC: 9 MG/DL (ref 9–23)
CHLORIDE SERPL-SCNC: 97 MMOL/L (ref 98–107)
CREAT SERPL-MCNC: 2.58 MG/DL (ref 0.55–1.02)
EOSINOPHIL # BLD AUTO: 0.2 10*3/UL (ref 0–0.4)
EOSINOPHIL # BLD AUTO: 3.9 % (ref 1–4)
ERYTHROCYTE [DISTWIDTH] IN BLOOD BY AUTOMATED COUNT: 18.7 % (ref 0–14.5)
HCT VFR BLD AUTO: 35.2 % (ref 37–47)
LYMPHOCYTES # BLD AUTO: 0.6 10*3/UL (ref 1.3–4.4)
LYMPHOCYTES NFR BLD AUTO: 15 % (ref 27–41)
MCH RBC QN AUTO: 29.5 PG (ref 27–31)
MCHC RBC AUTO-ENTMCNC: 29.5 G/DL (ref 33–37)
MCV RBC AUTO: 100 FL (ref 81–99)
MONOCYTES # BLD AUTO: 0.2 10*3/UL (ref 0.1–1)
MONOCYTES NFR BLD MANUAL: 4.4 % (ref 3–9)
NEUT #: 2.9 10*3/UL (ref 2.3–7.9)
NEUT %: 75.9 % (ref 47–73)
NRBC BLD QL AUTO: 0 10*3/UL (ref 0–0)
PLATELET # BLD AUTO: 152 10*3/UL (ref 130–400)
PMV BLD AUTO: 10 FL (ref 9.6–12.3)
POTASSIUM SERPL-SCNC: 3.5 MMOL/L (ref 3.4–5.1)
RBC # BLD AUTO: 3.52 10*6/UL (ref 4.1–5.1)
SODIUM SERPL-SCNC: 136 MMOL/L (ref 136–145)
WBC NRBC COR # BLD AUTO: 3.9 10*3/UL (ref 4.8–10.8)

## 2022-12-09 ENCOUNTER — HOSPITAL ENCOUNTER (OUTPATIENT)
Dept: HOSPITAL 83 - WOUNDCARE | Age: 67
End: 2022-12-09
Attending: NURSE PRACTITIONER
Payer: MEDICARE

## 2022-12-09 DIAGNOSIS — Z53.21: Primary | ICD-10-CM

## 2022-12-12 ENCOUNTER — HOSPITAL ENCOUNTER (OUTPATIENT)
Dept: HOSPITAL 83 - WOUNDCARE | Age: 67
Discharge: HOME | End: 2022-12-12
Payer: MEDICARE

## 2022-12-12 DIAGNOSIS — G60.9: ICD-10-CM
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
## 2022-12-12 DIAGNOSIS — G25.81: ICD-10-CM

## 2022-12-12 DIAGNOSIS — I73.9: ICD-10-CM

## 2022-12-12 DIAGNOSIS — L97.521: ICD-10-CM

## 2022-12-12 DIAGNOSIS — Z95.1: ICD-10-CM

## 2022-12-12 DIAGNOSIS — Z89.412: ICD-10-CM

## 2022-12-12 DIAGNOSIS — Z87.891: ICD-10-CM

## 2022-12-12 DIAGNOSIS — Z95.5: ICD-10-CM

## 2022-12-12 DIAGNOSIS — M24.571: ICD-10-CM

## 2022-12-12 DIAGNOSIS — M86.671: ICD-10-CM

## 2022-12-12 DIAGNOSIS — I25.10: ICD-10-CM
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
## 2022-12-12 DIAGNOSIS — T86.828: Primary | ICD-10-CM

## 2022-12-12 DIAGNOSIS — T81.89XD: ICD-10-CM

## 2022-12-12 DIAGNOSIS — L97.514: ICD-10-CM

## 2022-12-12 DIAGNOSIS — I12.0: ICD-10-CM

## 2022-12-12 DIAGNOSIS — Z99.2: ICD-10-CM

## 2022-12-12 DIAGNOSIS — L84: ICD-10-CM

## 2022-12-12 DIAGNOSIS — E78.5: ICD-10-CM

## 2022-12-12 DIAGNOSIS — K21.9: ICD-10-CM

## 2022-12-12 DIAGNOSIS — J44.9: ICD-10-CM

## 2022-12-12 DIAGNOSIS — N18.6: ICD-10-CM

## 2022-12-12 DIAGNOSIS — I87.2: ICD-10-CM

## 2022-12-12 DIAGNOSIS — R60.9: ICD-10-CM

## 2022-12-12 DIAGNOSIS — Z95.4: ICD-10-CM

## 2022-12-14 ENCOUNTER — HOSPITAL ENCOUNTER (OUTPATIENT)
Dept: HOSPITAL 83 - SDC | Age: 67
Discharge: HOME | End: 2022-12-14
Payer: MEDICARE

## 2022-12-14 VITALS — BODY MASS INDEX: 36.32 KG/M2 | HEIGHT: 62.99 IN | WEIGHT: 205 LBS

## 2022-12-14 VITALS — DIASTOLIC BLOOD PRESSURE: 40 MMHG

## 2022-12-14 VITALS — DIASTOLIC BLOOD PRESSURE: 48 MMHG | SYSTOLIC BLOOD PRESSURE: 104 MMHG

## 2022-12-14 VITALS — DIASTOLIC BLOOD PRESSURE: 25 MMHG

## 2022-12-14 DIAGNOSIS — N18.9: ICD-10-CM

## 2022-12-14 DIAGNOSIS — I50.9: ICD-10-CM
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
## 2022-12-14 DIAGNOSIS — S91.301A: Primary | ICD-10-CM

## 2022-12-14 DIAGNOSIS — I25.2: ICD-10-CM

## 2022-12-14 DIAGNOSIS — J44.9: ICD-10-CM

## 2022-12-14 DIAGNOSIS — I25.10: ICD-10-CM

## 2022-12-14 DIAGNOSIS — Z95.1: ICD-10-CM

## 2022-12-14 DIAGNOSIS — K21.9: ICD-10-CM

## 2022-12-14 DIAGNOSIS — I13.2: ICD-10-CM

## 2022-12-15 LAB — SPECIMEN PREPARATION: (no result)

## 2022-12-16 ENCOUNTER — HOSPITAL ENCOUNTER (OUTPATIENT)
Dept: HOSPITAL 83 - WOUNDCARE | Age: 67
End: 2022-12-16
Attending: NURSE PRACTITIONER
Payer: MEDICARE

## 2022-12-16 DIAGNOSIS — Z53.21: Primary | ICD-10-CM

## 2022-12-19 ENCOUNTER — HOSPITAL ENCOUNTER (OUTPATIENT)
Dept: HOSPITAL 83 - WOUNDCARE | Age: 67
Discharge: HOME | End: 2022-12-19
Payer: MEDICARE

## 2022-12-19 DIAGNOSIS — G25.81: ICD-10-CM

## 2022-12-19 DIAGNOSIS — I12.0: ICD-10-CM

## 2022-12-19 DIAGNOSIS — M86.671: ICD-10-CM

## 2022-12-19 DIAGNOSIS — I87.2: ICD-10-CM

## 2022-12-19 DIAGNOSIS — R60.9: ICD-10-CM

## 2022-12-19 DIAGNOSIS — N18.6: ICD-10-CM

## 2022-12-19 DIAGNOSIS — J44.9: ICD-10-CM

## 2022-12-19 DIAGNOSIS — T86.828: Primary | ICD-10-CM

## 2022-12-19 DIAGNOSIS — M24.571: ICD-10-CM

## 2022-12-19 DIAGNOSIS — I73.9: ICD-10-CM

## 2022-12-19 DIAGNOSIS — L97.514: ICD-10-CM

## 2022-12-19 DIAGNOSIS — L97.521: ICD-10-CM
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
## 2022-12-19 DIAGNOSIS — Z87.891: ICD-10-CM

## 2022-12-19 DIAGNOSIS — K21.9: ICD-10-CM

## 2022-12-19 DIAGNOSIS — G60.9: ICD-10-CM

## 2022-12-19 DIAGNOSIS — Z95.1: ICD-10-CM

## 2022-12-19 DIAGNOSIS — Z95.5: ICD-10-CM

## 2022-12-19 DIAGNOSIS — Z95.4: ICD-10-CM
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
## 2022-12-19 DIAGNOSIS — T81.89XD: ICD-10-CM

## 2022-12-19 DIAGNOSIS — E11.621: ICD-10-CM

## 2022-12-19 DIAGNOSIS — Z99.2: ICD-10-CM

## 2022-12-19 DIAGNOSIS — Z89.412: ICD-10-CM

## 2022-12-19 DIAGNOSIS — E78.5: ICD-10-CM

## 2022-12-19 DIAGNOSIS — I25.10: ICD-10-CM

## 2023-01-16 ENCOUNTER — HOSPITAL ENCOUNTER (OUTPATIENT)
Dept: HOSPITAL 83 - WOUNDCARE | Age: 68
Discharge: HOME | End: 2023-01-16
Payer: MEDICARE

## 2023-01-16 DIAGNOSIS — R60.9: ICD-10-CM

## 2023-01-16 DIAGNOSIS — K21.9: ICD-10-CM

## 2023-01-16 DIAGNOSIS — I73.9: ICD-10-CM

## 2023-01-16 DIAGNOSIS — Z89.412: ICD-10-CM

## 2023-01-16 DIAGNOSIS — I25.10: ICD-10-CM

## 2023-01-16 DIAGNOSIS — Z95.1: ICD-10-CM

## 2023-01-16 DIAGNOSIS — E78.5: ICD-10-CM

## 2023-01-16 DIAGNOSIS — T86.828: Primary | ICD-10-CM

## 2023-01-16 DIAGNOSIS — G25.81: ICD-10-CM

## 2023-01-16 DIAGNOSIS — Z95.4: ICD-10-CM

## 2023-01-16 DIAGNOSIS — Z99.2: ICD-10-CM
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
## 2023-01-16 DIAGNOSIS — L97.521: ICD-10-CM

## 2023-01-16 DIAGNOSIS — L89.610: ICD-10-CM

## 2023-01-16 DIAGNOSIS — Z95.5: ICD-10-CM

## 2023-01-16 DIAGNOSIS — L97.514: ICD-10-CM

## 2023-01-16 DIAGNOSIS — I12.0: ICD-10-CM

## 2023-01-16 DIAGNOSIS — Z87.891: ICD-10-CM

## 2023-01-16 DIAGNOSIS — L84: ICD-10-CM

## 2023-01-16 DIAGNOSIS — M24.571: ICD-10-CM

## 2023-01-16 DIAGNOSIS — G60.9: ICD-10-CM

## 2023-01-16 DIAGNOSIS — I87.2: ICD-10-CM

## 2023-01-16 DIAGNOSIS — N18.6: ICD-10-CM

## 2023-01-16 DIAGNOSIS — J44.9: ICD-10-CM

## 2023-01-16 DIAGNOSIS — M86.671: ICD-10-CM

## 2023-01-23 ENCOUNTER — HOSPITAL ENCOUNTER (OUTPATIENT)
Dept: HOSPITAL 83 - WOUNDCARE | Age: 68
Discharge: HOME | End: 2023-01-23
Payer: MEDICARE

## 2023-01-23 DIAGNOSIS — G25.81: ICD-10-CM

## 2023-01-23 DIAGNOSIS — N18.6: ICD-10-CM

## 2023-01-23 DIAGNOSIS — T86.828: Primary | ICD-10-CM

## 2023-01-23 DIAGNOSIS — I87.2: ICD-10-CM

## 2023-01-23 DIAGNOSIS — I12.0: ICD-10-CM

## 2023-01-23 DIAGNOSIS — Z95.4: ICD-10-CM

## 2023-01-23 DIAGNOSIS — G60.9: ICD-10-CM

## 2023-01-23 DIAGNOSIS — Z89.412: ICD-10-CM
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
## 2023-01-23 DIAGNOSIS — L97.514: ICD-10-CM

## 2023-01-23 DIAGNOSIS — M86.671: ICD-10-CM

## 2023-01-23 DIAGNOSIS — Z99.2: ICD-10-CM

## 2023-01-23 DIAGNOSIS — I73.9: ICD-10-CM

## 2023-01-23 DIAGNOSIS — K21.9: ICD-10-CM

## 2023-01-23 DIAGNOSIS — Z95.1: ICD-10-CM

## 2023-01-23 DIAGNOSIS — L97.522: ICD-10-CM

## 2023-01-23 DIAGNOSIS — Z87.891: ICD-10-CM

## 2023-01-23 DIAGNOSIS — L84: ICD-10-CM

## 2023-01-23 DIAGNOSIS — R60.9: ICD-10-CM

## 2023-01-23 DIAGNOSIS — Z95.5: ICD-10-CM

## 2023-01-23 DIAGNOSIS — M24.571: ICD-10-CM

## 2023-01-23 DIAGNOSIS — E78.5: ICD-10-CM

## 2023-01-23 DIAGNOSIS — I25.10: ICD-10-CM

## 2023-01-23 DIAGNOSIS — J44.9: ICD-10-CM

## 2023-01-30 ENCOUNTER — HOSPITAL ENCOUNTER (OUTPATIENT)
Dept: HOSPITAL 83 - WOUNDCARE | Age: 68
Discharge: HOME | End: 2023-01-30
Payer: MEDICARE

## 2023-01-30 DIAGNOSIS — Z89.422: ICD-10-CM

## 2023-01-30 DIAGNOSIS — I70.8: ICD-10-CM

## 2023-01-30 DIAGNOSIS — Z98.890: ICD-10-CM

## 2023-01-30 DIAGNOSIS — M86.9: Primary | ICD-10-CM

## 2023-01-30 DIAGNOSIS — Z89.421: ICD-10-CM

## 2023-01-30 DIAGNOSIS — L97.512: ICD-10-CM

## 2023-01-30 DIAGNOSIS — L97.522: ICD-10-CM

## 2025-01-22 NOTE — NUR
OT NOTE
Pt seen this date for 1:1 therapy session for 20 min and was identified by
name and . Upon arrival pt was supine in bed and gave consent for therapy
and had 2.5LO2 via NC. Pt rated L foot pain at a 1-2/10.  Pt transferred
supine to sit EOB w SBA and requested a gown to cover her posterior.  Gown was
donned requiring Min A for mgmt. Pt stated she did not want to wear O2 for
session and removed NC. O2 was read at 92% and heart rate 80 bpm. Pt educated
on importance of wearing oxygen with functional mobility and agreed to wear NC
for O2. Pt performed SPT from EOB to bedside commode maintaining NWB to
LLE with 75% compliance w CGA and ww and multiple verbal cues for hand
placement, safety and technique.  Pt performed SPT from bedside commode to EOB
with fair follow through for safety and tachnique. Pt then SPT from EOB to
recliner with SBA w improved sucess and technique requiring a few verbal cues
for safety.  Upon descent pt impulsive and sat without proper handplacement
and proximity to chair. At end of session pt reported pain at 4/10 and was
seated in recliner w bedside table, and call light within reach.  Continue w
recommended D/C to SNF.
 
NEERAJ Ravi/TAE Rodriguez/LINDA 22-Jan-2025 22:27
